# Patient Record
Sex: MALE | Race: WHITE | Employment: FULL TIME | URBAN - METROPOLITAN AREA
[De-identification: names, ages, dates, MRNs, and addresses within clinical notes are randomized per-mention and may not be internally consistent; named-entity substitution may affect disease eponyms.]

---

## 2017-01-06 ENCOUNTER — OFFICE VISIT (OUTPATIENT)
Dept: INTERNAL MEDICINE CLINIC | Age: 37
End: 2017-01-06

## 2017-01-06 VITALS
BODY MASS INDEX: 32.37 KG/M2 | WEIGHT: 239 LBS | SYSTOLIC BLOOD PRESSURE: 126 MMHG | HEART RATE: 72 BPM | HEIGHT: 72 IN | DIASTOLIC BLOOD PRESSURE: 70 MMHG

## 2017-01-06 DIAGNOSIS — K44.9 HIATAL HERNIA WITH GERD: ICD-10-CM

## 2017-01-06 DIAGNOSIS — K21.9 HIATAL HERNIA WITH GERD: ICD-10-CM

## 2017-01-06 PROCEDURE — 99212 OFFICE O/P EST SF 10 MIN: CPT | Performed by: INTERNAL MEDICINE

## 2017-01-06 RX ORDER — PANTOPRAZOLE SODIUM 40 MG/1
40 TABLET, DELAYED RELEASE ORAL DAILY
Qty: 30 TABLET | Refills: 11 | Status: SHIPPED | OUTPATIENT
Start: 2017-01-06 | End: 2018-01-11 | Stop reason: SDUPTHER

## 2017-01-06 ASSESSMENT — PATIENT HEALTH QUESTIONNAIRE - PHQ9
2. FEELING DOWN, DEPRESSED OR HOPELESS: 0
1. LITTLE INTEREST OR PLEASURE IN DOING THINGS: 0
SUM OF ALL RESPONSES TO PHQ9 QUESTIONS 1 & 2: 0
SUM OF ALL RESPONSES TO PHQ QUESTIONS 1-9: 0

## 2017-07-05 ENCOUNTER — OFFICE VISIT (OUTPATIENT)
Dept: INTERNAL MEDICINE CLINIC | Age: 37
End: 2017-07-05

## 2017-07-05 VITALS
HEIGHT: 72 IN | SYSTOLIC BLOOD PRESSURE: 118 MMHG | HEART RATE: 60 BPM | BODY MASS INDEX: 31.02 KG/M2 | DIASTOLIC BLOOD PRESSURE: 60 MMHG | WEIGHT: 229 LBS

## 2017-07-05 DIAGNOSIS — Z86.010 HISTORY OF COLON POLYPS: ICD-10-CM

## 2017-07-05 DIAGNOSIS — K21.9 GASTROESOPHAGEAL REFLUX DISEASE, ESOPHAGITIS PRESENCE NOT SPECIFIED: Primary | ICD-10-CM

## 2017-07-05 PROCEDURE — 99213 OFFICE O/P EST LOW 20 MIN: CPT | Performed by: INTERNAL MEDICINE

## 2018-01-03 ENCOUNTER — TELEPHONE (OUTPATIENT)
Dept: INTERNAL MEDICINE CLINIC | Age: 38
End: 2018-01-03

## 2018-01-03 NOTE — TELEPHONE ENCOUNTER
Wife calling for pt   Pt under a lot of stress and very moy   Both feel he would benefit from being on antidepressant ( wife takes prozac )   They were hoping to get pt started  On med and then f/u with you     Please advise

## 2018-01-11 DIAGNOSIS — K21.9 HIATAL HERNIA WITH GERD: ICD-10-CM

## 2018-01-11 DIAGNOSIS — K44.9 HIATAL HERNIA WITH GERD: ICD-10-CM

## 2018-01-11 RX ORDER — PANTOPRAZOLE SODIUM 40 MG/1
40 TABLET, DELAYED RELEASE ORAL DAILY
Qty: 30 TABLET | Refills: 11 | Status: SHIPPED | OUTPATIENT
Start: 2018-01-11 | End: 2019-01-11 | Stop reason: SDUPTHER

## 2018-09-17 ENCOUNTER — OFFICE VISIT (OUTPATIENT)
Dept: INTERNAL MEDICINE CLINIC | Age: 38
End: 2018-09-17

## 2018-09-17 VITALS
SYSTOLIC BLOOD PRESSURE: 120 MMHG | TEMPERATURE: 98.5 F | WEIGHT: 252 LBS | DIASTOLIC BLOOD PRESSURE: 68 MMHG | HEART RATE: 64 BPM | BODY MASS INDEX: 35.28 KG/M2 | HEIGHT: 71 IN

## 2018-09-17 DIAGNOSIS — R05.9 COUGH: Primary | ICD-10-CM

## 2018-09-17 PROCEDURE — 99213 OFFICE O/P EST LOW 20 MIN: CPT | Performed by: INTERNAL MEDICINE

## 2018-09-17 RX ORDER — LEVOFLOXACIN 500 MG/1
500 TABLET, FILM COATED ORAL DAILY
Qty: 7 TABLET | Refills: 0 | Status: SHIPPED | OUTPATIENT
Start: 2018-09-17 | End: 2018-09-24

## 2018-09-17 ASSESSMENT — PATIENT HEALTH QUESTIONNAIRE - PHQ9
1. LITTLE INTEREST OR PLEASURE IN DOING THINGS: 0
SUM OF ALL RESPONSES TO PHQ QUESTIONS 1-9: 0
SUM OF ALL RESPONSES TO PHQ9 QUESTIONS 1 & 2: 0
2. FEELING DOWN, DEPRESSED OR HOPELESS: 0
SUM OF ALL RESPONSES TO PHQ QUESTIONS 1-9: 0

## 2018-09-20 ENCOUNTER — HOSPITAL ENCOUNTER (OUTPATIENT)
Dept: OTHER | Age: 38
Discharge: HOME | End: 2018-09-20
Attending: INTERNAL MEDICINE | Admitting: INTERNAL MEDICINE

## 2018-09-20 ENCOUNTER — TELEPHONE (OUTPATIENT)
Dept: INTERNAL MEDICINE CLINIC | Age: 38
End: 2018-09-20

## 2018-09-20 DIAGNOSIS — R05.9 COUGH: ICD-10-CM

## 2018-09-20 DIAGNOSIS — R05.9 COUGH: Primary | ICD-10-CM

## 2018-09-20 NOTE — TELEPHONE ENCOUNTER
Wife concerned about pt not better   -- chest hurts  Cough and wheezing           Chest x ray per AB   Pt advised

## 2019-01-11 DIAGNOSIS — K44.9 HIATAL HERNIA WITH GERD: ICD-10-CM

## 2019-01-11 DIAGNOSIS — K21.9 HIATAL HERNIA WITH GERD: ICD-10-CM

## 2019-01-11 RX ORDER — PANTOPRAZOLE SODIUM 40 MG/1
40 TABLET, DELAYED RELEASE ORAL DAILY
Qty: 30 TABLET | Refills: 11 | Status: SHIPPED | OUTPATIENT
Start: 2019-01-11 | End: 2020-01-28

## 2019-03-18 ENCOUNTER — OFFICE VISIT (OUTPATIENT)
Dept: INTERNAL MEDICINE CLINIC | Age: 39
End: 2019-03-18
Payer: COMMERCIAL

## 2019-03-18 VITALS
WEIGHT: 260 LBS | TEMPERATURE: 98.6 F | HEART RATE: 64 BPM | DIASTOLIC BLOOD PRESSURE: 80 MMHG | BODY MASS INDEX: 36.26 KG/M2 | SYSTOLIC BLOOD PRESSURE: 120 MMHG

## 2019-03-18 DIAGNOSIS — G44.89 OTHER HEADACHE SYNDROME: Primary | ICD-10-CM

## 2019-03-18 PROCEDURE — 99213 OFFICE O/P EST LOW 20 MIN: CPT | Performed by: INTERNAL MEDICINE

## 2019-03-18 ASSESSMENT — PATIENT HEALTH QUESTIONNAIRE - PHQ9
SUM OF ALL RESPONSES TO PHQ9 QUESTIONS 1 & 2: 0
SUM OF ALL RESPONSES TO PHQ QUESTIONS 1-9: 0
1. LITTLE INTEREST OR PLEASURE IN DOING THINGS: 0
2. FEELING DOWN, DEPRESSED OR HOPELESS: 0
SUM OF ALL RESPONSES TO PHQ QUESTIONS 1-9: 0

## 2019-03-22 ENCOUNTER — TELEPHONE (OUTPATIENT)
Dept: INTERNAL MEDICINE CLINIC | Age: 39
End: 2019-03-22

## 2019-03-22 DIAGNOSIS — D35.2 PITUITARY ADENOMA WITH EXTRASELLAR EXTENSION (HCC): Primary | ICD-10-CM

## 2019-03-22 DIAGNOSIS — R93.0 ABNORMAL MRI OF HEAD: Primary | ICD-10-CM

## 2019-04-01 ENCOUNTER — TELEPHONE (OUTPATIENT)
Dept: INTERNAL MEDICINE CLINIC | Age: 39
End: 2019-04-01

## 2019-04-01 NOTE — TELEPHONE ENCOUNTER
Dr steele's office called and said that dr Shaw Trammell would like dr Royce Moreau to call him about this pt

## 2019-04-06 ENCOUNTER — HOSPITAL ENCOUNTER (OUTPATIENT)
Age: 39
Discharge: HOME | End: 2019-04-06
Payer: COMMERCIAL

## 2019-04-06 LAB
CORTISOL - AM: 9.7 UG/DL (ref 4.3–22.4)
FOLLICLE STIMULATING HORMONE: 2.8 MIU/ML
LUTEINIZING HORMONE: 2.9 MIU/ML
PROLACTIN: 8 NG/ML
TSH SERPL DL<=0.05 MIU/L-ACNC: 1.68 UIU/ML (ref 0.27–4.2)

## 2019-04-06 PROCEDURE — 84270 ASSAY OF SEX HORMONE GLOBUL: CPT

## 2019-04-06 PROCEDURE — 82024 ASSAY OF ACTH: CPT

## 2019-04-06 PROCEDURE — 84305 ASSAY OF SOMATOMEDIN: CPT

## 2019-04-06 PROCEDURE — 82533 TOTAL CORTISOL: CPT

## 2019-04-06 PROCEDURE — 83003 ASSAY GROWTH HORMONE (HGH): CPT

## 2019-04-06 PROCEDURE — 83002 ASSAY OF GONADOTROPIN (LH): CPT

## 2019-04-06 PROCEDURE — 84403 ASSAY OF TOTAL TESTOSTERONE: CPT

## 2019-04-06 PROCEDURE — 83001 ASSAY OF GONADOTROPIN (FSH): CPT

## 2019-04-06 PROCEDURE — 84146 ASSAY OF PROLACTIN: CPT

## 2019-04-06 PROCEDURE — 36415 COLL VENOUS BLD VENIPUNCTURE: CPT

## 2019-04-06 PROCEDURE — 84443 ASSAY THYROID STIM HORMONE: CPT

## 2019-04-09 LAB
GROWTH HORMONE: <0.05 NG/ML (ref 0.05–3)
IGF-1 (INSULIN-LIKE GROWTH I): 151 NG/ML (ref 83–238)
INSULIN-LIKE GROWTH FACTOR-1 Z-SCORE: 0
SEX HORMONE BINDING GLOBULIN: 10 NMOL/L (ref 11–80)
TESTOSTERONE FREE-NONMALE: 48.5 PG/ML (ref 47–244)
TESTOSTERONE TOTAL: 155 NG/DL (ref 220–1000)

## 2019-04-10 LAB — ADRENOCORTICOTROPIC HORMONE: 28 PG/ML (ref 7–69)

## 2019-07-01 ENCOUNTER — OFFICE VISIT (OUTPATIENT)
Dept: ENDOCRINOLOGY | Age: 39
End: 2019-07-01
Payer: COMMERCIAL

## 2019-07-01 VITALS
HEIGHT: 71 IN | BODY MASS INDEX: 37.52 KG/M2 | WEIGHT: 268 LBS | RESPIRATION RATE: 16 BRPM | DIASTOLIC BLOOD PRESSURE: 79 MMHG | OXYGEN SATURATION: 97 % | SYSTOLIC BLOOD PRESSURE: 128 MMHG | HEART RATE: 70 BPM

## 2019-07-01 DIAGNOSIS — D35.2 PITUITARY ADENOMA (HCC): Primary | ICD-10-CM

## 2019-07-01 PROCEDURE — 99204 OFFICE O/P NEW MOD 45 MIN: CPT | Performed by: INTERNAL MEDICINE

## 2019-07-01 NOTE — PROGRESS NOTES
Subjective:      43 y/o WM seen for pituitary adenoma. He had headache and MRA done showed an adenoma. Headache started 3/19, lasted for a week, now resolved  No vision changes    States visual field exam normal at [facility]  No breast discharge  No ED, low libido  No change in hand size, shoe size    He saw neurosurgery, was recommend 6 month f/u MRI    3/19  CONCLUSION:   1. 1.3 cm inhomogeneous sellar mass deviating the pituitary stalk to the left impinging on    infundibulum most consistent with macro adenoma.  Suspected extension into the cavernous    carotid region on the right, possibly on the left. 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  This mass deviates the infundibulum to the    left and mildly effaces the optic chiasm.  No evidence of cavernous sinus invasion. Moderate, no worsening factors. Labs:    4/19 Testosterone 155  FSH 2.5  LH 2.9  Prolactin 8  IGF-1 normal  ACTH 28 Cortisol 9.7    Past Medical History:   Diagnosis Date    Hyperglycemia     Hyperlipidemia      No past surgical history on file. Current Outpatient Medications   Medication Sig Dispense Refill    sertraline (ZOLOFT) 50 MG tablet TAKE ONE TABLET BY MOUTH DAILY 30 tablet 11    pantoprazole (PROTONIX) 40 MG tablet TAKE 1 TABLET BY MOUTH DAILY 30 tablet 11     No current facility-administered medications for this visit. Review of Systems  Please see scanned     Objective: There were no vitals taken for this visit.   Wt Readings from Last 3 Encounters:   03/18/19 260 lb (117.9 kg)   09/17/18 252 lb (114.3 kg)   07/05/17 229 lb (103.9 kg)       Constitutional: Well-developed, appears stated age, cooperative, in no acute distress  H/E/N/M/T:atraumatic, normocephalic, external ears, nose, lips normal without lesions  Eyes: Lids, lashes, conjunctivae and sclerae normal, No proptosis  Neck: supple, symmetrical, trachea midline   Thyroid: gland size normal, non-tender on

## 2019-07-19 ENCOUNTER — TELEPHONE (OUTPATIENT)
Dept: INTERNAL MEDICINE CLINIC | Age: 39
End: 2019-07-19

## 2019-07-19 DIAGNOSIS — G47.30 SLEEP APNEA, UNSPECIFIED TYPE: Primary | ICD-10-CM

## 2019-09-24 ENCOUNTER — OFFICE VISIT (OUTPATIENT)
Dept: INTERNAL MEDICINE CLINIC | Age: 39
End: 2019-09-24
Payer: COMMERCIAL

## 2019-09-24 VITALS
WEIGHT: 264 LBS | SYSTOLIC BLOOD PRESSURE: 130 MMHG | DIASTOLIC BLOOD PRESSURE: 80 MMHG | BODY MASS INDEX: 36.96 KG/M2 | HEIGHT: 71 IN | HEART RATE: 76 BPM

## 2019-09-24 DIAGNOSIS — Z23 NEED FOR INFLUENZA VACCINATION: ICD-10-CM

## 2019-09-24 DIAGNOSIS — Z00.00 PHYSICAL EXAM: Primary | ICD-10-CM

## 2019-09-24 LAB
A/G RATIO: 2.2 (ref 1.1–2.2)
ALBUMIN SERPL-MCNC: 4.6 G/DL (ref 3.4–5)
ALP BLD-CCNC: 104 U/L (ref 40–129)
ALT SERPL-CCNC: 39 U/L (ref 10–40)
ANION GAP SERPL CALCULATED.3IONS-SCNC: 14 MMOL/L (ref 3–16)
AST SERPL-CCNC: 29 U/L (ref 15–37)
BILIRUB SERPL-MCNC: 0.6 MG/DL (ref 0–1)
BUN BLDV-MCNC: 19 MG/DL (ref 7–20)
CALCIUM SERPL-MCNC: 9.2 MG/DL (ref 8.3–10.6)
CHLORIDE BLD-SCNC: 106 MMOL/L (ref 99–110)
CHOLESTEROL, TOTAL: 179 MG/DL (ref 0–199)
CO2: 23 MMOL/L (ref 21–32)
CREAT SERPL-MCNC: 1 MG/DL (ref 0.9–1.3)
GFR AFRICAN AMERICAN: >60
GFR NON-AFRICAN AMERICAN: >60
GLOBULIN: 2.1 G/DL
GLUCOSE BLD-MCNC: 121 MG/DL (ref 70–99)
HDLC SERPL-MCNC: 33 MG/DL (ref 40–60)
LDL CHOLESTEROL CALCULATED: ABNORMAL MG/DL
LDL CHOLESTEROL DIRECT: 93 MG/DL
POTASSIUM SERPL-SCNC: 4.2 MMOL/L (ref 3.5–5.1)
SODIUM BLD-SCNC: 143 MMOL/L (ref 136–145)
TOTAL PROTEIN: 6.7 G/DL (ref 6.4–8.2)
TRIGL SERPL-MCNC: 436 MG/DL (ref 0–150)
VLDLC SERPL CALC-MCNC: ABNORMAL MG/DL

## 2019-09-24 PROCEDURE — 90686 IIV4 VACC NO PRSV 0.5 ML IM: CPT | Performed by: INTERNAL MEDICINE

## 2019-09-24 PROCEDURE — 99395 PREV VISIT EST AGE 18-39: CPT | Performed by: INTERNAL MEDICINE

## 2019-09-24 PROCEDURE — 90471 IMMUNIZATION ADMIN: CPT | Performed by: INTERNAL MEDICINE

## 2019-09-24 NOTE — PROGRESS NOTES
Chief Complaint   Patient presents with   [facility] 44 y.o. male is here for  physical examination. The patient denies chest pain, chest tightness, shortness of breath or other cardiovascular symptoms suggesting end organ damage from their hypertension. There has been compliance to medications and no new problems are reported. Current Outpatient Medications   Medication Sig Dispense Refill    sertraline (ZOLOFT) 50 MG tablet TAKE ONE TABLET BY MOUTH DAILY 30 tablet 11    pantoprazole (PROTONIX) 40 MG tablet TAKE 1 TABLET BY MOUTH DAILY 30 tablet 11     No current facility-administered medications for this visit.         Past Medical History:   Diagnosis Date    Hyperglycemia     Hyperlipidemia            /80   Pulse 76   Ht 5' 11\" (1.803 m)   Wt 264 lb (119.7 kg)   BMI 36.82 kg/m²     General Appearance:  Alert, cooperative, no distress, appears stated age   Head:  Normocephalic, without obvious abnormality, atraumatic   Neck: Supple, symmetrical, trachea midline, no adenopathy, thyroid: not enlarged, symmetric, no tenderness/mass/nodules, no carotid bruit or JVD   Lungs:   Clear to auscultation bilaterally, respirations unlabored   Chest Wall:  No tenderness or deformity   Heart:  Regular rate and rhythm, S1, S2 normal, no murmur, rub or gallop   Abdomen:   Soft, non-tender, bowel sounds active all four quadrants,  no masses, no organomegaly, no hernias   Skin: Skin color, texture, turgor normal, no rashes or lesions   Lymph nodes: Cervical, supraclavicular  Adenopathy is absent   Neurologic: No focal neurological deficits noted, funduscopic examination no papilledema       No components found for: CHLPL  Lab Results   Component Value Date    TRIG 299 (H) 01/24/2011     Lab Results   Component Value Date    HDL 28 (L) 01/24/2011     Lab Results   Component Value Date    LDLCALC 83 01/24/2011     Lab Results   Component Value Date    LABVLDL 60 01/24/2011     Lab

## 2020-01-28 RX ORDER — PANTOPRAZOLE SODIUM 40 MG/1
TABLET, DELAYED RELEASE ORAL
Qty: 90 TABLET | Refills: 3 | Status: SHIPPED | OUTPATIENT
Start: 2020-01-28 | End: 2021-05-13 | Stop reason: SDUPTHER

## 2020-02-10 ENCOUNTER — HOSPITAL ENCOUNTER (OUTPATIENT)
Age: 40
Discharge: HOME | End: 2020-02-10
Payer: COMMERCIAL

## 2020-02-10 ENCOUNTER — TELEPHONE (OUTPATIENT)
Dept: ENDOCRINOLOGY | Age: 40
End: 2020-02-10

## 2020-02-10 LAB
PROLACTIN: 7.7 NG/ML
T3 FREE: 3.4 PG/ML (ref 2.3–4.2)
T4 FREE: 0.8 NG/DL (ref 0.9–1.8)

## 2020-02-10 PROCEDURE — 82530 CORTISOL FREE: CPT

## 2020-02-10 PROCEDURE — 84146 ASSAY OF PROLACTIN: CPT

## 2020-02-10 PROCEDURE — 84481 FREE ASSAY (FT-3): CPT

## 2020-02-10 PROCEDURE — 36415 COLL VENOUS BLD VENIPUNCTURE: CPT

## 2020-02-10 PROCEDURE — 84439 ASSAY OF FREE THYROXINE: CPT

## 2020-02-13 LAB
CORTISOL (UR), FREE: 45 UG/D
CORTISOL URINE, FREE (/G CRT): 16.04 UG/G CRT
CORTISOL,F,UG/L,U: 30 UG/L
CREATININE 24 HOUR URINE: 2805 MG/D (ref 1000–2500)
CREATININE URINE: 187 MG/DL
HOURS COLLECTED: 24 HR
INTERPRETATION: ABNORMAL
URINE TOTAL VOLUME: 1500 ML

## 2020-02-19 ENCOUNTER — OFFICE VISIT (OUTPATIENT)
Dept: ENDOCRINOLOGY | Age: 40
End: 2020-02-19
Payer: COMMERCIAL

## 2020-02-19 VITALS — OXYGEN SATURATION: 97 % | HEART RATE: 67 BPM | SYSTOLIC BLOOD PRESSURE: 112 MMHG | DIASTOLIC BLOOD PRESSURE: 67 MMHG

## 2020-02-19 PROCEDURE — 99213 OFFICE O/P EST LOW 20 MIN: CPT | Performed by: INTERNAL MEDICINE

## 2020-02-19 NOTE — PROGRESS NOTES
Subjective:      43 y/o WM seen for pituitary adenoma. Interim:  Had MRI 2/20  No increase  Saw [facility]    He had headache and MRA done showed an adenoma. Headache started 3/19, lasted for a week, now resolved  No vision changes    States visual field exam normal at [facility]  No breast discharge  No ED, low libido  No change in hand size, shoe size    He saw neurosurgery, was recommend 6 month f/u MRI    3/19  CONCLUSION:   1. 1.3 cm inhomogeneous sellar mass deviating the pituitary stalk to the left impinging on    infundibulum most consistent with macro adenoma.  Suspected extension into the cavernous    carotid region on the right, possibly on the left. 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  This mass deviates the infundibulum to the    left and mildly effaces the optic chiasm.  No evidence of cavernous sinus invasion. Moderate, no worsening factors. 2/20 MRI  Pituitary macro adenoma measuring 1.51cm by 1.11cm.  This mass deviates the infundibulum to the    left and mildly effaces the optic chiasm.  No evidence of cavernous sinus invasion. Labs:    4/19 Testosterone 155  FSH 2.5  LH 2.9  Prolactin 8  IGF-1 normal  ACTH 28 Cortisol 9.7    1/20  Cortisol urine 24 normal  FT4. 0.8 L FT3 nl  Prolactin nl    Past Medical History:   Diagnosis Date    Hyperglycemia     Hyperlipidemia      No past surgical history on file. Current Outpatient Medications   Medication Sig Dispense Refill    pantoprazole (PROTONIX) 40 MG tablet TAKE ONE TABLET BY MOUTH DAILY 90 tablet 3    sertraline (ZOLOFT) 50 MG tablet TAKE ONE TABLET BY MOUTH DAILY 30 tablet 11     No current facility-administered medications for this visit.           Review of Systems  Please see scanned     Objective:    /67 (Site: Left Upper Arm, Position: Sitting, Cuff Size: Medium Adult)   Pulse 67   SpO2 97%   Wt Readings from Last 3 Encounters:   09/24/19 264 lb (119.7 kg)   07/01/19 268 lb (121.6 kg)   03/18/19 260 lb (117.9 kg)       Constitutional: Well-developed, appears stated age, cooperative, in no acute distress  H/E/N/M/T:atraumatic, normocephalic, external ears, nose, lips normal without lesions  Eyes: Lids, lashes, conjunctivae and sclerae normal, No proptosis  Neck: supple, symmetrical, trachea midline   Thyroid: gland size normal, non-tender on palpation  Respiratory: clear to auscultation bilaterally and breathing is unlabored  Cardiovascular: regular rate and rhythm, S1, S2, regular rate and rhythm, no murmur, rub or gallop. Skin: No obvious rashes or lesions present. Skin and hair texture normal  Psychiatric: Judgement and Insight:  judgement and insight appear normal  Neuro: Normal without focal findings, speech is normal normal, speech is spontaneous, and movements are coordinated, alert and oriented x3    Lab Review  Lab Results   Component Value Date    TSH 1.68 04/06/2019     No results found for: FREET4      Assessment:     Pituitary Adenoma: Macroadenoma 1.5cm, incidental finding, non functional,nl 24 hour urine cortisol and free thyroid level. VF is normal. Testosterone is low , not symptomatic, will hold on replacement . [address] low but IGF-1 is normal. 6 month f/u stable. Discussed symptoms of apoplexy . FT4 slightly low, repeat in a year    Plan:     1. FT4, FT3, TSH in a year  2. Prolactin in a year

## 2020-12-07 ENCOUNTER — TELEPHONE (OUTPATIENT)
Dept: ENT CLINIC | Age: 40
End: 2020-12-07

## 2020-12-11 ENCOUNTER — TELEPHONE (OUTPATIENT)
Dept: ENT CLINIC | Age: 40
End: 2020-12-11

## 2020-12-15 ENCOUNTER — OFFICE VISIT (OUTPATIENT)
Dept: ENT CLINIC | Age: 40
End: 2020-12-15
Payer: COMMERCIAL

## 2020-12-15 VITALS
TEMPERATURE: 97.7 F | BODY MASS INDEX: 38.84 KG/M2 | HEIGHT: 71 IN | SYSTOLIC BLOOD PRESSURE: 117 MMHG | DIASTOLIC BLOOD PRESSURE: 75 MMHG | HEART RATE: 65 BPM | WEIGHT: 277.4 LBS

## 2020-12-15 PROCEDURE — 99244 OFF/OP CNSLTJ NEW/EST MOD 40: CPT | Performed by: OTOLARYNGOLOGY

## 2020-12-15 ASSESSMENT — ENCOUNTER SYMPTOMS
CONSTIPATION: 0
WHEEZING: 0
NAUSEA: 0
RHINORRHEA: 0
TROUBLE SWALLOWING: 0
FACIAL SWELLING: 0
SHORTNESS OF BREATH: 0
CHEST TIGHTNESS: 0
SINUS PRESSURE: 0
APNEA: 0
VOICE CHANGE: 0
VOMITING: 0
EYE PAIN: 0
CHOKING: 0
COLOR CHANGE: 0
COUGH: 0
DIARRHEA: 0
STRIDOR: 0
BLOOD IN STOOL: 0
SORE THROAT: 0
SINUS PAIN: 0
BACK PAIN: 0
EYE DISCHARGE: 0

## 2020-12-15 NOTE — PROGRESS NOTES
Endocrine: Negative for cold intolerance, heat intolerance, polydipsia, polyphagia and polyuria. Musculoskeletal: Negative for back pain, gait problem, neck pain and neck stiffness. Skin: Negative for color change, pallor, rash and wound. Allergic/Immunologic: Negative for environmental allergies, food allergies and immunocompromised state. Neurological: Negative for dizziness, facial asymmetry, speech difficulty, light-headedness, numbness and headaches. Hematological: Negative for adenopathy. Does not bruise/bleed easily. Psychiatric/Behavioral: Negative for agitation, confusion, self-injury and sleep disturbance. The patient is not nervous/anxious. Objective:   Physical Exam  Constitutional:       Appearance: He is well-developed. He is not ill-appearing. HENT:      Head: Normocephalic and atraumatic. Not macrocephalic and not microcephalic. No raccoon eyes, Dueñas's sign, abrasion, contusion, right periorbital erythema, left periorbital erythema or laceration. Hair is normal.      Jaw: No trismus. Salivary Glands: Right salivary gland is not diffusely enlarged. Left salivary gland is not diffusely enlarged. Right Ear: Hearing, tympanic membrane and external ear normal. No decreased hearing noted. No drainage, swelling or tenderness. No middle ear effusion. No mastoid tenderness. Tympanic membrane is not perforated, retracted or bulging. Tympanic membrane has normal mobility. Left Ear: Hearing, tympanic membrane and external ear normal. No decreased hearing noted. No drainage, swelling or tenderness. No middle ear effusion. No mastoid tenderness. Tympanic membrane is not perforated, retracted or bulging. Tympanic membrane has normal mobility. Ears:      Osborne exam findings: does not lateralize. Right Rinne: AC > BC. Left Rinne: AC > BC. Nose: No nasal deformity, septal deviation, laceration, mucosal edema or rhinorrhea. Right Nostril: No epistaxis. Left Nostril: No epistaxis. Right Turbinates: Not enlarged. Left Turbinates: Not enlarged. Right Sinus: No maxillary sinus tenderness or frontal sinus tenderness. Left Sinus: No maxillary sinus tenderness or frontal sinus tenderness. Mouth/Throat:      Lips: No lesions. Mouth: Mucous membranes are not pale, not dry and not cyanotic. No lacerations or oral lesions. Dentition: Normal dentition. No dental caries or dental abscesses. Tongue: No lesions. Palate: No mass. Pharynx: Uvula midline. No oropharyngeal exudate, posterior oropharyngeal erythema or uvula swelling. Tonsils: No tonsillar abscesses. Eyes:      General: Lids are normal. Lids are everted, no foreign bodies appreciated. Right eye: No discharge. Left eye: No discharge. Extraocular Movements:      Right eye: Normal extraocular motion and no nystagmus. Left eye: Normal extraocular motion and no nystagmus. Conjunctiva/sclera:      Right eye: No chemosis or exudate. Left eye: No chemosis or exudate. Neck:      Musculoskeletal: Neck supple. Thyroid: No thyroid mass or thyromegaly. Vascular: Normal carotid pulses. Trachea: Trachea normal. No tracheal deviation. Cardiovascular:      Rate and Rhythm: Normal rate and regular rhythm. Pulmonary:      Effort: No tachypnea, bradypnea or respiratory distress. Breath sounds: No stridor. Musculoskeletal:      Right shoulder: He exhibits normal range of motion. Left shoulder: He exhibits normal range of motion. Lymphadenopathy:      Head:      Right side of head: No submental, submandibular, tonsillar, preauricular, posterior auricular or occipital adenopathy. Left side of head: No submental, submandibular, tonsillar, preauricular, posterior auricular or occipital adenopathy. Cervical:      Right cervical: No superficial, deep or posterior cervical adenopathy. Left cervical: No superficial, deep or posterior cervical adenopathy. Skin:     Findings: No bruising, erythema, laceration or lesion. Neurological:      Mental Status: He is alert and oriented to person, place, and time. Psychiatric:         Speech: Speech normal.         Behavior: Behavior normal.         Assessment:       Diagnosis Orders   1. Pituitary macroadenoma ([facility] [address]  CT SINUS WO CONTRAST           Plan:      OR for endo pit. The patient has a diagnosis of pituitary tumor which has not been responsive or cannot be treated with maximal medical therapy. The patient has been advised of options including further medical therapy, surgery, or nothing. The risks and benefits of surgery were described not limited to injury to the skull base, brain, stroke, hemorrhage, brain fluid leak, double vision, visual loss, epistaxis, and need for further surgical management of disease. Patient expectations during and after surgery including post-operative sinonasal care and pain control were described. Questions were answered and the patient agreed to proceed with surgery which will be scheduled in an appropriate time frame in line with the severity of disease. The patient was counseled at length about the risks of ashley Covid-19 in the tianna-operative and post-operative states including the recovery window of their procedure. The patient was made aware that ashley Covid-19 after a surgical procedure may worsen their prognosis for recovering from the virus and lend to a higher morbidity and or mortality risk. The patient was given the options of postponing their procedure. All of the risks, benefits, and alternatives were discussed. The patient does wish to proceed with the procedure. Medical Decision Making:   The following items were considered in medical decision making:  Independent review of images  Review / order clinical lab tests  Review / order radiology tests Decision to obtain old records  Review and summation of old records as accessed through [facility] (a summary of my findings in these old records: [facility] notes, imaging and labs)                  Andreas Carey MD

## 2021-01-13 ENCOUNTER — HOSPITAL ENCOUNTER (OUTPATIENT)
Dept: CT IMAGING | Age: 41
Discharge: HOME | End: 2021-01-13
Payer: COMMERCIAL

## 2021-01-13 DIAGNOSIS — D35.2 PITUITARY MACROADENOMA (HCC): ICD-10-CM

## 2021-01-13 PROCEDURE — 70486 CT MAXILLOFACIAL W/O DYE: CPT

## 2021-01-19 ENCOUNTER — OFFICE VISIT (OUTPATIENT)
Dept: INTERNAL MEDICINE CLINIC | Age: 41
End: 2021-01-19
Payer: COMMERCIAL

## 2021-01-19 VITALS
HEART RATE: 78 BPM | TEMPERATURE: 97.5 F | WEIGHT: 273 LBS | HEIGHT: 72 IN | DIASTOLIC BLOOD PRESSURE: 64 MMHG | BODY MASS INDEX: 36.98 KG/M2 | OXYGEN SATURATION: 98 % | SYSTOLIC BLOOD PRESSURE: 120 MMHG

## 2021-01-19 DIAGNOSIS — D35.2 PITUITARY MACROADENOMA (HCC): ICD-10-CM

## 2021-01-19 DIAGNOSIS — Z01.818 PRE-OP TESTING: Primary | ICD-10-CM

## 2021-01-19 DIAGNOSIS — Z23 NEED FOR INFLUENZA VACCINATION: ICD-10-CM

## 2021-01-19 PROCEDURE — 90471 IMMUNIZATION ADMIN: CPT | Performed by: NURSE PRACTITIONER

## 2021-01-19 PROCEDURE — 99242 OFF/OP CONSLTJ NEW/EST SF 20: CPT | Performed by: NURSE PRACTITIONER

## 2021-01-19 PROCEDURE — 90674 CCIIV4 VAC NO PRSV 0.5 ML IM: CPT | Performed by: NURSE PRACTITIONER

## 2021-01-19 PROCEDURE — 93000 ELECTROCARDIOGRAM COMPLETE: CPT | Performed by: NURSE PRACTITIONER

## 2021-01-19 ASSESSMENT — PATIENT HEALTH QUESTIONNAIRE - PHQ9
SUM OF ALL RESPONSES TO PHQ QUESTIONS 1-9: 0
1. LITTLE INTEREST OR PLEASURE IN DOING THINGS: 0
2. FEELING DOWN, DEPRESSED OR HOPELESS: 0

## 2021-01-19 NOTE — PROGRESS NOTES
[address] [facility] patients having surgery or anesthesia are required to be Covid tested. You will need to quarantine from the time you are tested until your surgery. PRIOR TO PROCEDURE DATE:        1. PLEASE FOLLOW ANY  GUIDELINES/ INSTRUCTIONS PRIOR TO YOUR PROCEDURE AS ADVISED BY YOUR SURGEON. 2. Arrange for someone to drive you home and be with you for the first 24 hours after discharge for your safety after your procedure for which you received sedation. Ensure it is someone we can share information with regarding your discharge. 3. You must contact your surgeon for instructions IF:  ? You are taking any blood thinners, aspirin, anti-inflammatory or vitamin E.  ? There is a change in your physical condition such as a cold, fever, rash, cuts, sores or any other infection, especially near your surgical site. 4. Do not drink alcohol the day before or day of your procedure. 5. A Pre-op History and Physical for surgery MUST be completed by your Physician or Urgent Care within 30 days of your procedure date. Please bring a copy with you on the day of your procedure and along with any other testing performed. THE DAY OF YOUR PROCEDURE:  1. Follow instructions for ARRIVAL TIME as DIRECTED BY YOUR SURGEON. I    1. Enter the MAIN entrance from [address] and follow the signs to the free [employer] or [employer] parking (offered free of charge 6am-5pm). 2. Enter the Main Entrance of the hospital (do not enter from the lower level of the parking garage). Upon entrance, check in with the  at the main desk on your left. If no one is available at the desk, proceed into the [facility] Waiting Room and go through the door directly into the [facility]. There is a Check-in desk ACROSS from Room 5 (marked with a sign hanging from the ceiling). The phone number for the surgery center is [phone]. 4. Please call [phone] option #2 option #2 if you have not been preregistered yet. On the day of your procedure bring your insurance card and photo ID. You will be registered at your bedside once brought back to your room. 5. DO NOT EAT ANYTHING eight hours prior to your arrival for surgery. May have 8 ounces of water 4 hours prior to your arrival for surgery. NOTE: ALL Gastric, Bariatric and Bowel surgery patients MUST follow their surgeon's instructions. 6. MEDICATIONS   ? Take the following medications with a SMALL sip of water: none  ? Use your usual dose of inhalers the morning of surgery. BRING your rescue inhaler with you to hospital.   ? Anesthesia does NOT want you to take insulin the morning of surgery. They will control your blood sugar while you are at the hospital. Please contact your ordering physician for instructions regarding your insulin the night before your procedure. If you have an insulin pump, please keep it set on basal rate. 7. Do not swallow water when brushing teeth. No gum, candy, mints or ice chips. Refrain from smoking or at least decrease the amount. 8. Dress in loose, comfortable clothing appropriate for redressing after your procedure. Do not wear jewelry (including body piercings), make-up (especially NO eye make-up), fingernail polish (NO toenail polish if foot/leg surgery), lotion, powders or metal hairclips. 9. Dentures, glasses, or contacts will need to be removed before your procedure. Bring cases for your glasses, contacts, dentures, or hearing aids to protect them while you are in surgery. 10. If you use a CPAP, please bring it with you on the day of your procedure. 11. We recommend that valuable personal  belongings such as cash, cell phones, e-tablets or jewelry, be left at home during your stay. The hospital will not be responsible for valuables that are not secured in the hospital safe. However, if your insurance requires a co-pay, you may want to bring a method of payment, i.e. Check or credit card, if you wish to pay your co-pay the day of surgery. 12. If you are to stay overnight, you may bring a bag with personal items. Please have any large items you may need brought in by your family after your arrival to your hospital room. 15. If you have a Living Will or Durable Power of , please bring a copy on the day of your procedure. 15. With your permission, one family member may accompany you while you are being prepared for surgery. Once you are ready, additional family members may join you. HOW WE KEEP YOU SAFE and WORK TO PREVENT SURGICAL SITE INFECTIONS:  1. Health care workers should always check your ID bracelet to verify your name and birth date. You will be asked many times to state your name, date of birth, and allergies. 2. Health care workers should always clean their hands with soap or alcohol gel before providing care to you. It is okay to ask anyone if they cleaned their hands before they touch you. 3. You will be actively involved in verifying the type of procedure you are having and ensuring the correct surgical site. This will be confirmed multiple times prior to your procedure. Do NOT ashley your surgery site UNLESS instructed to by your surgeon. 4. Do not shave or wax for 72 hours prior to procedure near your operative site. Shaving with a razor can irritate your skin and make it easier to develop an infection. On the day of your procedure, any hair that needs to be removed near the surgical site will be clipped by a healthcare worker using a special clippers designed to avoid skin irritation. 5. When you are in the operating room, your surgical site will be cleansed with a special soap, and in most cases, you will be given an antibiotic before the surgery begins. What to expect AFTER YOUR PROCEDURE:  1. Immediately following your procedure, your will be taken to the PACU for the first phase of your recovery. Your nurse will help you recover from any potential side effects of anesthesia, such as extreme drowsiness, changes in your vital signs or breathing patterns. Nausea, headache, muscle aches, or sore throat may also occur after anesthesia. Your nurse will help you manage these potential side effects. 2. For comfort and safety, arrange to have someone at home with you for the first 24 hours after discharge. 3. You and your family will be given written instructions about your diet, activity, dressing care, medications, and return visits. 4. Once at home, should issues with nausea, pain, or bleeding occur, or should you notice any signs of infection, you should call your surgeon. 5. Always clean your hands before and after caring for your wound. Do not let your family touch your surgery site without cleaning their hands. 6. Narcotic pain medications can cause significant constipation. You may want to add a stool softener to your postoperative medication schedule or speak to your surgeon on how best to manage this SIDE EFFECT. SPECIAL INSTRUCTIONS reviewed restricted visitation that is in place as of today with patient, bring in cpap but can clarify the use of in light of the type of surgery he is having. Ultimately to follow instructions that have been given to him by surgeons regarding when to be NPO. May bring in small bag for personal items since being admitted day of surgery. Patient acknowledges understanding of pre op instructions. Thank you for allowing us to care for you. We strive to exceed your expectations in the delivery of care and service provided to you and your family. If you need to contact the [place] [address] staff for any reason, please call us at [phone]    Instructions reviewed with patient during preadmission testing phone interview. Alem Champagne. 1/19/2021 .11:35 AM      ADDITIONAL EDUCATIONAL INFORMATION REVIEWED PER PHONE WITH YOU AND/OR YOUR FAMILY:  No Bring a urine sample on day of surgery  Yes Hibiclens® Bathing Instructions   No Antibacterial Soap

## 2021-01-19 NOTE — PROGRESS NOTES
Preoperative Consultation      Lesly Vivar  YOB: 1980    Date of Service:  1/19/2021    This patient presents today at the request of the surgeon for a preoperative consultation. Surgeon: Dr. Avtar Wolfe  Indication for surgery: pituitary macroadenoma  Scheduled procedure: Pituitary tumor transsphenoidal stereotactic surgery  Date of surgery: 1/27/21  Location of surgery: [facility]  Indicated/required preoperative testing: H&P, EKG  Smoker: No  Previous anesthesia complications: No  Family history of anesthesia complications: No  Loose, capped or false teeth: No  Recent chest pain or SOB: No  Known Bleeding Risk: No recent or remote history of abnormal bleeding  Personal or FH of DVT/PE: No    Patient objection to receiving blood products: No      No Known Allergies      Current Outpatient Medications   Medication Sig Dispense Refill    Cholecalciferol (VITAMIN D3) 125 MCG (5000 UT) TABS Take by mouth      Loratadine (CLARITIN PO) Take by mouth      pantoprazole (PROTONIX) 40 MG tablet TAKE ONE TABLET BY MOUTH DAILY 90 tablet 3    Multiple Vitamins-Minerals (MULTIVITAMIN ADULT PO) Take by mouth      sertraline (ZOLOFT) 50 MG tablet TAKE ONE TABLET BY MOUTH DAILY (Patient not taking: Reported on 12/15/2020) 90 tablet 3     No current facility-administered medications for this visit. Patient Active Problem List   Diagnosis    Hyperglycemia    Hyperlipidemia       Past Medical History:   Diagnosis Date    Allergic rhinitis     Dizziness     GERD (gastroesophageal reflux disease)     Headache     Hyperglycemia     Hyperlipidemia     Recurrent upper respiratory infection (URI)     Sleep apnea     Tinnitus        No past surgical history on file. Family History   Problem Relation Age of Onset    Elevated Lipids Mother     Hypertension Father          Review of Systems  A comprehensive review of systems was negative except for what was noted in the HPI.        Physical Exam Vitals:    01/19/21 0902   Temp: 97.5 °F (36.4 °C)   Weight: 273 lb (123.8 kg)   Height: 5' 11.5\" (1.816 m)        Constitutional: He is oriented to person, place, and time. He appears well-developed and well-nourished. No distress. HENT:   Head: Normocephalic and atraumatic. Mouth/Throat: Uvula is midline, oropharynx is clear and moist and mucous membranes are normal.   Eyes: Conjunctivae and EOM are normal.   Neck: Trachea normal and normal range of motion. Neck supple. Cardiovascular: Normal rate, regular rhythm, normal heart sounds and intact distal pulses. Pulmonary/Chest: Effort normal and breath sounds normal. No respiratory distress. He has no wheezes. He has no rales. Abdominal: Soft, non-tender. Bowel sounds are normal.   Musculoskeletal: He exhibits no edema and no tenderness. Neurological: He is alert and oriented to person, place, and time. Skin: Skin is warm and dry. No rash noted. No erythema. Psychiatric: He has a normal mood and affect. His behavior is normal.       EKG Interpretation: normal sinus rhythm  Lab Review:  Lab Results   Component Value Date     09/24/2019    K 4.2 09/24/2019     09/24/2019    CO2 23 09/24/2019    BUN 19 09/24/2019    CREATININE 1.0 09/24/2019    GLUCOSE 121 09/24/2019    CALCIUM 9.2 09/24/2019     No results found for: WBC, HGB, HCT, MCV, PLT      Assessment:     36 y.o. patient with planned surgery as above. Known risk factors for perioperative complications: GERD, Obstructive sleep apnea     Plan:     1. Preoperative workup as follows: none  2. Change in medication regimen before surgery: Discontinue MVI - already done  3. Prophylaxis for cardiac events with perioperative beta-blockers: Not indicated  4. Deep vein thrombosis prophylaxis: regimen to be chosen by surgical team  5. No contraindications to planned surgery.       Kartik Frederick, [address] [place] [address],[address] Internal Medicine and Rheumatology  [phone]

## 2021-01-20 ENCOUNTER — HOSPITAL ENCOUNTER (OUTPATIENT)
Age: 41
Discharge: HOME | End: 2021-01-20
Payer: COMMERCIAL

## 2021-01-20 LAB
ANION GAP SERPL CALCULATED.3IONS-SCNC: 13 MMOL/L (ref 3–16)
APTT: 29.7 SEC (ref 24.2–36.2)
BUN BLDV-MCNC: 21 MG/DL (ref 7–20)
CALCIUM SERPL-MCNC: 9.6 MG/DL (ref 8.3–10.6)
CHLORIDE BLD-SCNC: 108 MMOL/L (ref 99–110)
CO2: 23 MMOL/L (ref 21–32)
CORTISOL - AM: 12.7 UG/DL (ref 4.3–22.4)
CREAT SERPL-MCNC: 1 MG/DL (ref 0.9–1.3)
FOLLICLE STIMULATING HORMONE: 3.6 MIU/ML
GFR AFRICAN AMERICAN: >60
GFR NON-AFRICAN AMERICAN: >60
GLUCOSE BLD-MCNC: 109 MG/DL (ref 70–99)
HCT VFR BLD CALC: 43.3 % (ref 40.5–52.5)
HEMOGLOBIN: 14.5 G/DL (ref 13.5–17.5)
INR BLD: 1.09 (ref 0.86–1.14)
LUTEINIZING HORMONE: 4.1 MIU/ML
MCH RBC QN AUTO: 28.8 PG (ref 26–34)
MCHC RBC AUTO-ENTMCNC: 33.5 G/DL (ref 31–36)
MCV RBC AUTO: 85.9 FL (ref 80–100)
PDW BLD-RTO: 13.8 % (ref 12.4–15.4)
PLATELET # BLD: 228 K/UL (ref 135–450)
PMV BLD AUTO: 8.6 FL (ref 5–10.5)
POTASSIUM SERPL-SCNC: 4.4 MMOL/L (ref 3.5–5.1)
PROLACTIN: 7.8 NG/ML
PROTHROMBIN TIME: 12.6 SEC (ref 10–13.2)
RBC # BLD: 5.04 M/UL (ref 4.2–5.9)
SODIUM BLD-SCNC: 144 MMOL/L (ref 136–145)
TSH SERPL DL<=0.05 MIU/L-ACNC: 2.22 UIU/ML (ref 0.27–4.2)
WBC # BLD: 5.6 K/UL (ref 4–11)

## 2021-01-20 PROCEDURE — 84305 ASSAY OF SOMATOMEDIN: CPT

## 2021-01-20 PROCEDURE — 36415 COLL VENOUS BLD VENIPUNCTURE: CPT

## 2021-01-20 PROCEDURE — 84403 ASSAY OF TOTAL TESTOSTERONE: CPT

## 2021-01-20 PROCEDURE — 85027 COMPLETE CBC AUTOMATED: CPT

## 2021-01-20 PROCEDURE — 83002 ASSAY OF GONADOTROPIN (LH): CPT

## 2021-01-20 PROCEDURE — 83003 ASSAY GROWTH HORMONE (HGH): CPT

## 2021-01-20 PROCEDURE — 82533 TOTAL CORTISOL: CPT

## 2021-01-20 PROCEDURE — 80048 BASIC METABOLIC PNL TOTAL CA: CPT

## 2021-01-20 PROCEDURE — 83001 ASSAY OF GONADOTROPIN (FSH): CPT

## 2021-01-20 PROCEDURE — 84146 ASSAY OF PROLACTIN: CPT

## 2021-01-20 PROCEDURE — 84443 ASSAY THYROID STIM HORMONE: CPT

## 2021-01-20 PROCEDURE — 82024 ASSAY OF ACTH: CPT

## 2021-01-20 PROCEDURE — 85730 THROMBOPLASTIN TIME PARTIAL: CPT

## 2021-01-20 PROCEDURE — 85610 PROTHROMBIN TIME: CPT

## 2021-01-21 ENCOUNTER — OFFICE VISIT (OUTPATIENT)
Dept: PRIMARY CARE CLINIC | Age: 41
End: 2021-01-21
Payer: COMMERCIAL

## 2021-01-21 DIAGNOSIS — Z20.828 EXPOSURE TO SARS-ASSOCIATED CORONAVIRUS: Primary | ICD-10-CM

## 2021-01-21 LAB
GROWTH HORMONE: <0.05 NG/ML (ref 0.05–3)
SARS-COV-2, PCR: NOT DETECTED

## 2021-01-21 PROCEDURE — 99211 OFF/OP EST MAY X REQ PHY/QHP: CPT | Performed by: NURSE PRACTITIONER

## 2021-01-21 NOTE — PROGRESS NOTES
[facility] received a viral test for COVID-19. They were educated on isolation and quarantine as appropriate. For any symptoms, they were directed to seek care from their PCP, given contact information to establish with a doctor, directed to an urgent care or the emergency room.

## 2021-01-21 NOTE — PATIENT INSTRUCTIONS
You have received a viral test for COVID-19. Below is education on quarantine per the CDC guidelines. For any symptoms, seek care from your PCP, call [phone] to establish care with a doctor, or go directly to an urgent care or the emergency room. Test results will take 2-7 days and will be sent to you in your [employer] account. If you test positive, you will be contacted via phone. If you test negative, the ONLY communication will be through [address] GO TO [place] AND SIGN UP FOR [employer]  (LOWER LEFT OF THE HOME PAGE)  No test is 100%. If you have symptoms, you should follow the guidance of quarantine as previously stated. You can still be contagious if you have symptoms. Your [facility] will reach out to you if you have a positive result. They will provide you with a return to work date and note. If you were tested for a pre-op, then you should remain in quarantine until your procedure. How do I know if I need to be in quarantine? If you live in a community where COVID-19 is or might be spreading (currently, that is virtually everywhere in the [facility])  Be alert for symptoms. Watch for fever, cough, shortness of breath, or other symptoms of COVID-19.  ? Take your temperature if symptoms develop. ? Practice social distancing. Maintain 6 feet of distance from others and stay out of crowded places. ? Follow CDC guidance if symptoms develop. If you feel healthy but:  ? Recently had close contact with a person with COVID-19 you need to Quarantine:  ? Stay home until 14 days after your last exposure. ? Check your temperature twice a day and watch for symptoms of COVID-19.  ? If possible, stay away from people who are at higher-risk for getting very sick from COVID-19. Stay Home and Monitor Your Health if you:  ? Have been diagnosed with COVID-19, or  ? Are waiting for test results, or  ?  Have cough, fever, or shortness of breath, or symptoms of COVID-19 When You Can be Around Others After You Had or Likely Had COVID-19     If you have or think you might have COVID-19, it is important to stay home and away from other people. Staying away from others helps stop the spread of COVID-19. If you have an emergency warning sign (including trouble breathing), get emergency medical care immediately. When you can be around others (end home isolation) depends on different factors for different situations. Find CDC's recommendations for your situation below. I think or know I had COVID-19, and I had symptoms  You can be with others after  ? 3 days with no fever and  ? Respiratory symptoms have improved (e.g. cough, shortness of breath) and  ? 10 days since symptoms first appeared  Depending on your healthcare provider's advice and availability of testing, you might get tested to see if you still have COVID-19. If you will be tested, you can be around others when you have no fever, respiratory symptoms have improved, and you receive two negative test results in a row, at least 24 hours apart. I tested positive for COVID-19 but had no symptoms  If you continue to have no symptoms, you can be with others after:  ? 10 days have passed since test or 14 days since your exposure test   Depending on your healthcare provider's advice and availability of testing, you might get tested to see if you still have COVID-19. If you will be tested, you can be around others after you receive two negative test results in a row, at least 24 hours apart. If you develop symptoms after testing positive, follow the guidance above for I think or know I had COVID, and I had symptoms.   For Anyone Who Has Been Around a Person with COVID-19  It is important to remember that anyone who has close contact with someone with COVID-19 should stay home for 14 days after exposure based on the time it takes to develop illness. Testing is not necessary.     www.cdc.gov/coronavirus/2019-ncov/index.html

## 2021-01-22 LAB
IGF-1 (INSULIN-LIKE GROWTH I): 147 NG/ML (ref 81–236)
INSULIN-LIKE GROWTH FACTOR-1 Z-SCORE: 0
TESTOSTERONE TOTAL: 127 NG/DL (ref 220–1000)

## 2021-01-25 LAB — ADRENOCORTICOTROPIC HORMONE: 25 PG/ML (ref 7–69)

## 2021-01-26 ENCOUNTER — ANESTHESIA EVENT (OUTPATIENT)
Dept: OPERATING ROOM | Age: 41
DRG: 615 | End: 2021-01-26
Payer: COMMERCIAL

## 2021-01-26 ENCOUNTER — TELEPHONE (OUTPATIENT)
Dept: ENT CLINIC | Age: 41
End: 2021-01-26

## 2021-01-27 ENCOUNTER — HOSPITAL ENCOUNTER (INPATIENT)
Age: 41
LOS: 2 days | Discharge: HOME | DRG: 615 | End: 2021-01-29
Attending: NEUROLOGICAL SURGERY | Admitting: NEUROLOGICAL SURGERY
Payer: COMMERCIAL

## 2021-01-27 ENCOUNTER — APPOINTMENT (OUTPATIENT)
Dept: CT IMAGING | Age: 41
DRG: 615 | End: 2021-01-27
Attending: NEUROLOGICAL SURGERY
Payer: COMMERCIAL

## 2021-01-27 ENCOUNTER — ANESTHESIA (OUTPATIENT)
Dept: OPERATING ROOM | Age: 41
DRG: 615 | End: 2021-01-27
Payer: COMMERCIAL

## 2021-01-27 VITALS — TEMPERATURE: 98.2 F | SYSTOLIC BLOOD PRESSURE: 123 MMHG | DIASTOLIC BLOOD PRESSURE: 77 MMHG | OXYGEN SATURATION: 99 %

## 2021-01-27 DIAGNOSIS — D35.2 PITUITARY MACROADENOMA (HCC): ICD-10-CM

## 2021-01-27 LAB
ABO/RH: NORMAL
ANTIBODY SCREEN: NORMAL
BILIRUBIN URINE: NEGATIVE
BLOOD, URINE: ABNORMAL
CLARITY: CLEAR
COLOR: YELLOW
EPITHELIAL CELLS, UA: ABNORMAL /HPF (ref 0–5)
GLUCOSE URINE: NEGATIVE MG/DL
KETONES, URINE: NEGATIVE MG/DL
LEUKOCYTE ESTERASE, URINE: NEGATIVE
MICROSCOPIC EXAMINATION: YES
NITRITE, URINE: NEGATIVE
OSMOLALITY URINE: 954 MOSM/KG (ref 390–1070)
OSMOLALITY: 305 MOSM/KG (ref 278–305)
PH UA: 6 (ref 5–8)
PROTEIN UA: NEGATIVE MG/DL
RBC UA: ABNORMAL /HPF (ref 0–4)
SODIUM URINE: 177 MMOL/L
SPECIFIC GRAVITY UA: >=1.03 (ref 1–1.03)
URINE TYPE: ABNORMAL
UROBILINOGEN, URINE: 0.2 E.U./DL
WBC UA: ABNORMAL /HPF (ref 0–5)

## 2021-01-27 PROCEDURE — 3600000004 HC SURGERY LEVEL 4 BASE: Performed by: NEUROLOGICAL SURGERY

## 2021-01-27 PROCEDURE — 2580000003 HC RX 258: Performed by: NURSE PRACTITIONER

## 2021-01-27 PROCEDURE — 2580000003 HC RX 258: Performed by: ANESTHESIOLOGY

## 2021-01-27 PROCEDURE — 3700000000 HC ANESTHESIA ATTENDED CARE: Performed by: NEUROLOGICAL SURGERY

## 2021-01-27 PROCEDURE — 3600000014 HC SURGERY LEVEL 4 ADDTL 15MIN: Performed by: NEUROLOGICAL SURGERY

## 2021-01-27 PROCEDURE — 86901 BLOOD TYPING SEROLOGIC RH(D): CPT

## 2021-01-27 PROCEDURE — 2000000000 HC ICU R&B

## 2021-01-27 PROCEDURE — 2580000003 HC RX 258: Performed by: NURSE ANESTHETIST, CERTIFIED REGISTERED

## 2021-01-27 PROCEDURE — 83930 ASSAY OF BLOOD OSMOLALITY: CPT

## 2021-01-27 PROCEDURE — 6360000002 HC RX W HCPCS: Performed by: ANESTHESIOLOGY

## 2021-01-27 PROCEDURE — 88331 PATH CONSLTJ SURG 1 BLK 1SPC: CPT

## 2021-01-27 PROCEDURE — 84300 ASSAY OF URINE SODIUM: CPT

## 2021-01-27 PROCEDURE — 70450 CT HEAD/BRAIN W/O DYE: CPT

## 2021-01-27 PROCEDURE — 62165 REMOVE PITUIT TUMOR W/SCOPE: CPT | Performed by: OTOLARYNGOLOGY

## 2021-01-27 PROCEDURE — 3700000001 HC ADD 15 MINUTES (ANESTHESIA): Performed by: NEUROLOGICAL SURGERY

## 2021-01-27 PROCEDURE — 81001 URINALYSIS AUTO W/SCOPE: CPT

## 2021-01-27 PROCEDURE — 8E09XBG COMPUTER ASSISTED PROCEDURE OF HEAD AND NECK REGION, WITH COMPUTERIZED TOMOGRAPHY: ICD-10-PCS | Performed by: OTOLARYNGOLOGY

## 2021-01-27 PROCEDURE — 2500000003 HC RX 250 WO HCPCS: Performed by: NURSE ANESTHETIST, CERTIFIED REGISTERED

## 2021-01-27 PROCEDURE — 61782 SCAN PROC CRANIAL EXTRA: CPT | Performed by: OTOLARYNGOLOGY

## 2021-01-27 PROCEDURE — 86850 RBC ANTIBODY SCREEN: CPT

## 2021-01-27 PROCEDURE — 6360000002 HC RX W HCPCS: Performed by: NURSE PRACTITIONER

## 2021-01-27 PROCEDURE — 83935 ASSAY OF URINE OSMOLALITY: CPT

## 2021-01-27 PROCEDURE — 2580000003 HC RX 258: Performed by: NEUROLOGICAL SURGERY

## 2021-01-27 PROCEDURE — 6370000000 HC RX 637 (ALT 250 FOR IP): Performed by: NEUROLOGICAL SURGERY

## 2021-01-27 PROCEDURE — 0GB04ZZ EXCISION OF PITUITARY GLAND, PERCUTANEOUS ENDOSCOPIC APPROACH: ICD-10-PCS | Performed by: OTOLARYNGOLOGY

## 2021-01-27 PROCEDURE — 6370000000 HC RX 637 (ALT 250 FOR IP): Performed by: NURSE PRACTITIONER

## 2021-01-27 PROCEDURE — 7100000001 HC PACU RECOVERY - ADDTL 15 MIN: Performed by: NEUROLOGICAL SURGERY

## 2021-01-27 PROCEDURE — 7100000000 HC PACU RECOVERY - FIRST 15 MIN: Performed by: NEUROLOGICAL SURGERY

## 2021-01-27 PROCEDURE — 6360000002 HC RX W HCPCS: Performed by: NURSE ANESTHETIST, CERTIFIED REGISTERED

## 2021-01-27 PROCEDURE — 2720000010 HC SURG SUPPLY STERILE: Performed by: NEUROLOGICAL SURGERY

## 2021-01-27 PROCEDURE — 86900 BLOOD TYPING SEROLOGIC ABO: CPT

## 2021-01-27 PROCEDURE — 6360000002 HC RX W HCPCS: Performed by: NEUROLOGICAL SURGERY

## 2021-01-27 PROCEDURE — 2780000010 HC IMPLANT OTHER: Performed by: NEUROLOGICAL SURGERY

## 2021-01-27 PROCEDURE — 2500000003 HC RX 250 WO HCPCS: Performed by: NEUROLOGICAL SURGERY

## 2021-01-27 PROCEDURE — 2709999900 HC NON-CHARGEABLE SUPPLY: Performed by: NEUROLOGICAL SURGERY

## 2021-01-27 DEVICE — DURAGEN® PLUS DURAL REGENERATION MATRIX, 3 IN X 3 IN (7.5 CM X 7.5 CM)
Type: IMPLANTABLE DEVICE | Status: FUNCTIONAL
Brand: DURAGEN® PLUS

## 2021-01-27 RX ORDER — SODIUM CHLORIDE 9 MG/ML
INJECTION, SOLUTION INTRAVENOUS CONTINUOUS
Status: DISCONTINUED | OUTPATIENT
Start: 2021-01-27 | End: 2021-01-28

## 2021-01-27 RX ORDER — OXYMETAZOLINE HYDROCHLORIDE 0.05 G/100ML
SPRAY NASAL PRN
Status: DISCONTINUED | OUTPATIENT
Start: 2021-01-27 | End: 2021-01-27 | Stop reason: ALTCHOICE

## 2021-01-27 RX ORDER — CEFAZOLIN SODIUM 2 G/50ML
2 SOLUTION INTRAVENOUS ONCE
Status: COMPLETED | OUTPATIENT
Start: 2021-01-27 | End: 2021-01-27

## 2021-01-27 RX ORDER — MIDAZOLAM HYDROCHLORIDE 1 MG/ML
INJECTION INTRAMUSCULAR; INTRAVENOUS PRN
Status: DISCONTINUED | OUTPATIENT
Start: 2021-01-27 | End: 2021-01-27 | Stop reason: SDUPTHER

## 2021-01-27 RX ORDER — SODIUM CHLORIDE 0.9 % (FLUSH) 0.9 %
10 SYRINGE (ML) INJECTION EVERY 12 HOURS SCHEDULED
Status: DISCONTINUED | OUTPATIENT
Start: 2021-01-27 | End: 2021-01-27 | Stop reason: HOSPADM

## 2021-01-27 RX ORDER — PROMETHAZINE HYDROCHLORIDE 25 MG/ML
6.25 INJECTION, SOLUTION INTRAMUSCULAR; INTRAVENOUS
Status: COMPLETED | OUTPATIENT
Start: 2021-01-27 | End: 2021-01-27

## 2021-01-27 RX ORDER — SODIUM CHLORIDE, SODIUM LACTATE, POTASSIUM CHLORIDE, CALCIUM CHLORIDE 600; 310; 30; 20 MG/100ML; MG/100ML; MG/100ML; MG/100ML
INJECTION, SOLUTION INTRAVENOUS CONTINUOUS
Status: DISCONTINUED | OUTPATIENT
Start: 2021-01-27 | End: 2021-01-28

## 2021-01-27 RX ORDER — LIDOCAINE HYDROCHLORIDE 10 MG/ML
1 INJECTION, SOLUTION EPIDURAL; INFILTRATION; INTRACAUDAL; PERINEURAL
Status: DISCONTINUED | OUTPATIENT
Start: 2021-01-27 | End: 2021-01-27 | Stop reason: HOSPADM

## 2021-01-27 RX ORDER — LABETALOL 20 MG/4 ML (5 MG/ML) INTRAVENOUS SYRINGE
5 EVERY 10 MIN PRN
Status: DISCONTINUED | OUTPATIENT
Start: 2021-01-27 | End: 2021-01-27 | Stop reason: HOSPADM

## 2021-01-27 RX ORDER — SODIUM CHLORIDE, SODIUM LACTATE, POTASSIUM CHLORIDE, AND CALCIUM CHLORIDE .6; .31; .03; .02 G/100ML; G/100ML; G/100ML; G/100ML
IRRIGANT IRRIGATION PRN
Status: DISCONTINUED | OUTPATIENT
Start: 2021-01-27 | End: 2021-01-27 | Stop reason: ALTCHOICE

## 2021-01-27 RX ORDER — ACETAMINOPHEN 325 MG/1
650 TABLET ORAL EVERY 6 HOURS
Status: DISCONTINUED | OUTPATIENT
Start: 2021-01-27 | End: 2021-01-29 | Stop reason: HOSPADM

## 2021-01-27 RX ORDER — EPINEPHRINE NASAL SOLUTION 1 MG/ML
SOLUTION NASAL PRN
Status: DISCONTINUED | OUTPATIENT
Start: 2021-01-27 | End: 2021-01-27 | Stop reason: ALTCHOICE

## 2021-01-27 RX ORDER — LIDOCAINE HYDROCHLORIDE AND EPINEPHRINE 5; 5 MG/ML; UG/ML
INJECTION, SOLUTION INFILTRATION; PERINEURAL PRN
Status: DISCONTINUED | OUTPATIENT
Start: 2021-01-27 | End: 2021-01-27 | Stop reason: ALTCHOICE

## 2021-01-27 RX ORDER — SODIUM CHLORIDE, SODIUM LACTATE, POTASSIUM CHLORIDE, CALCIUM CHLORIDE 600; 310; 30; 20 MG/100ML; MG/100ML; MG/100ML; MG/100ML
INJECTION, SOLUTION INTRAVENOUS CONTINUOUS PRN
Status: DISCONTINUED | OUTPATIENT
Start: 2021-01-27 | End: 2021-01-27 | Stop reason: SDUPTHER

## 2021-01-27 RX ORDER — ROCURONIUM BROMIDE 10 MG/ML
INJECTION, SOLUTION INTRAVENOUS PRN
Status: DISCONTINUED | OUTPATIENT
Start: 2021-01-27 | End: 2021-01-27 | Stop reason: SDUPTHER

## 2021-01-27 RX ORDER — FENTANYL CITRATE 50 UG/ML
INJECTION, SOLUTION INTRAMUSCULAR; INTRAVENOUS PRN
Status: DISCONTINUED | OUTPATIENT
Start: 2021-01-27 | End: 2021-01-27 | Stop reason: SDUPTHER

## 2021-01-27 RX ORDER — DEXAMETHASONE SODIUM PHOSPHATE 4 MG/ML
INJECTION, SOLUTION INTRA-ARTICULAR; INTRALESIONAL; INTRAMUSCULAR; INTRAVENOUS; SOFT TISSUE PRN
Status: DISCONTINUED | OUTPATIENT
Start: 2021-01-27 | End: 2021-01-27 | Stop reason: SDUPTHER

## 2021-01-27 RX ORDER — PROPOFOL 10 MG/ML
INJECTION, EMULSION INTRAVENOUS PRN
Status: DISCONTINUED | OUTPATIENT
Start: 2021-01-27 | End: 2021-01-27 | Stop reason: SDUPTHER

## 2021-01-27 RX ORDER — FENTANYL CITRATE 50 UG/ML
25 INJECTION, SOLUTION INTRAMUSCULAR; INTRAVENOUS EVERY 5 MIN PRN
Status: DISCONTINUED | OUTPATIENT
Start: 2021-01-27 | End: 2021-01-27 | Stop reason: HOSPADM

## 2021-01-27 RX ORDER — PANTOPRAZOLE SODIUM 40 MG/1
40 TABLET, DELAYED RELEASE ORAL DAILY
Status: DISCONTINUED | OUTPATIENT
Start: 2021-01-27 | End: 2021-01-29 | Stop reason: HOSPADM

## 2021-01-27 RX ORDER — OXYMETAZOLINE HYDROCHLORIDE 0.05 G/100ML
2 SPRAY NASAL 2 TIMES DAILY
Status: DISCONTINUED | OUTPATIENT
Start: 2021-01-27 | End: 2021-01-29 | Stop reason: HOSPADM

## 2021-01-27 RX ORDER — ONDANSETRON 2 MG/ML
4 INJECTION INTRAMUSCULAR; INTRAVENOUS
Status: DISCONTINUED | OUTPATIENT
Start: 2021-01-27 | End: 2021-01-27 | Stop reason: HOSPADM

## 2021-01-27 RX ORDER — 0.9 % SODIUM CHLORIDE 0.9 %
500 INTRAVENOUS SOLUTION INTRAVENOUS
Status: DISCONTINUED | OUTPATIENT
Start: 2021-01-27 | End: 2021-01-27 | Stop reason: HOSPADM

## 2021-01-27 RX ORDER — HYDROMORPHONE HCL 110MG/55ML
PATIENT CONTROLLED ANALGESIA SYRINGE INTRAVENOUS PRN
Status: DISCONTINUED | OUTPATIENT
Start: 2021-01-27 | End: 2021-01-27 | Stop reason: SDUPTHER

## 2021-01-27 RX ORDER — METHYLPREDNISOLONE SODIUM SUCCINATE 1 G/16ML
INJECTION, POWDER, LYOPHILIZED, FOR SOLUTION INTRAMUSCULAR; INTRAVENOUS PRN
Status: DISCONTINUED | OUTPATIENT
Start: 2021-01-27 | End: 2021-01-27 | Stop reason: SDUPTHER

## 2021-01-27 RX ORDER — ONDANSETRON 2 MG/ML
INJECTION INTRAMUSCULAR; INTRAVENOUS PRN
Status: DISCONTINUED | OUTPATIENT
Start: 2021-01-27 | End: 2021-01-27 | Stop reason: SDUPTHER

## 2021-01-27 RX ORDER — SODIUM CHLORIDE 0.9 % (FLUSH) 0.9 %
10 SYRINGE (ML) INJECTION PRN
Status: DISCONTINUED | OUTPATIENT
Start: 2021-01-27 | End: 2021-01-27 | Stop reason: HOSPADM

## 2021-01-27 RX ORDER — METHOCARBAMOL 750 MG/1
750 TABLET, FILM COATED ORAL EVERY 6 HOURS SCHEDULED
Status: DISCONTINUED | OUTPATIENT
Start: 2021-01-28 | End: 2021-01-29 | Stop reason: HOSPADM

## 2021-01-27 RX ADMIN — SUGAMMADEX 200 MG: 100 INJECTION, SOLUTION INTRAVENOUS at 10:54

## 2021-01-27 RX ADMIN — ONDANSETRON 4 MG: 2 INJECTION INTRAMUSCULAR; INTRAVENOUS at 08:31

## 2021-01-27 RX ADMIN — CEFAZOLIN SODIUM 2 G: 2 SOLUTION INTRAVENOUS at 08:44

## 2021-01-27 RX ADMIN — ROCURONIUM BROMIDE 60 MG: 10 INJECTION INTRAVENOUS at 08:31

## 2021-01-27 RX ADMIN — SODIUM CHLORIDE 1500 MG: 900 INJECTION INTRAVENOUS at 21:04

## 2021-01-27 RX ADMIN — MIDAZOLAM HYDROCHLORIDE 2 MG: 2 INJECTION, SOLUTION INTRAMUSCULAR; INTRAVENOUS at 08:27

## 2021-01-27 RX ADMIN — METHOCARBAMOL 1000 MG: 100 INJECTION, SOLUTION INTRAMUSCULAR; INTRAVENOUS at 12:57

## 2021-01-27 RX ADMIN — METHOCARBAMOL 1000 MG: 100 INJECTION, SOLUTION INTRAMUSCULAR; INTRAVENOUS at 20:47

## 2021-01-27 RX ADMIN — SODIUM CHLORIDE: 9 INJECTION, SOLUTION INTRAVENOUS at 12:58

## 2021-01-27 RX ADMIN — DEXAMETHASONE SODIUM PHOSPHATE 8 MG: 4 INJECTION, SOLUTION INTRAMUSCULAR; INTRAVENOUS at 08:31

## 2021-01-27 RX ADMIN — HYDROMORPHONE HYDROCHLORIDE 0.5 MG: 2 INJECTION, SOLUTION INTRAMUSCULAR; INTRAVENOUS; SUBCUTANEOUS at 09:35

## 2021-01-27 RX ADMIN — METHYLPREDNISOLONE SODIUM SUCCINATE 100 MG: 1 INJECTION, POWDER, FOR SOLUTION INTRAMUSCULAR; INTRAVENOUS at 08:49

## 2021-01-27 RX ADMIN — ROCURONIUM BROMIDE 40 MG: 10 INJECTION INTRAVENOUS at 09:10

## 2021-01-27 RX ADMIN — PROPOFOL 200 MG: 10 INJECTION, EMULSION INTRAVENOUS at 08:31

## 2021-01-27 RX ADMIN — SALINE NASAL SPRAY 1 SPRAY: 1.5 SOLUTION NASAL at 21:09

## 2021-01-27 RX ADMIN — SODIUM CHLORIDE, POTASSIUM CHLORIDE, SODIUM LACTATE AND CALCIUM CHLORIDE: 600; 310; 30; 20 INJECTION, SOLUTION INTRAVENOUS at 07:52

## 2021-01-27 RX ADMIN — HYDROMORPHONE HYDROCHLORIDE 0.5 MG: 2 INJECTION, SOLUTION INTRAMUSCULAR; INTRAVENOUS; SUBCUTANEOUS at 11:05

## 2021-01-27 RX ADMIN — SODIUM CHLORIDE, SODIUM LACTATE, POTASSIUM CHLORIDE, AND CALCIUM CHLORIDE: .6; .31; .03; .02 INJECTION, SOLUTION INTRAVENOUS at 08:27

## 2021-01-27 RX ADMIN — HYDROMORPHONE HYDROCHLORIDE 1 MG: 2 INJECTION, SOLUTION INTRAMUSCULAR; INTRAVENOUS; SUBCUTANEOUS at 09:10

## 2021-01-27 RX ADMIN — FENTANYL CITRATE 100 MCG: 50 INJECTION INTRAMUSCULAR; INTRAVENOUS at 08:31

## 2021-01-27 RX ADMIN — ACETAMINOPHEN 650 MG: 325 TABLET ORAL at 20:47

## 2021-01-27 RX ADMIN — PROMETHAZINE HYDROCHLORIDE 6.25 MG: 25 INJECTION INTRAMUSCULAR; INTRAVENOUS at 11:53

## 2021-01-27 RX ADMIN — ONDANSETRON 4 MG: 2 INJECTION INTRAMUSCULAR; INTRAVENOUS at 10:54

## 2021-01-27 ASSESSMENT — PULMONARY FUNCTION TESTS
PIF_VALUE: 26
PIF_VALUE: 24
PIF_VALUE: 26
PIF_VALUE: 24
PIF_VALUE: 5
PIF_VALUE: 25
PIF_VALUE: 25
PIF_VALUE: 24
PIF_VALUE: 24
PIF_VALUE: 0
PIF_VALUE: 25
PIF_VALUE: 6
PIF_VALUE: 24
PIF_VALUE: 26
PIF_VALUE: 6
PIF_VALUE: 24
PIF_VALUE: 25
PIF_VALUE: 3
PIF_VALUE: 25
PIF_VALUE: 24
PIF_VALUE: 25
PIF_VALUE: 24
PIF_VALUE: 0
PIF_VALUE: 24
PIF_VALUE: 25
PIF_VALUE: 24
PIF_VALUE: 26
PIF_VALUE: 24
PIF_VALUE: 26
PIF_VALUE: 24
PIF_VALUE: 26
PIF_VALUE: 2
PIF_VALUE: 24
PIF_VALUE: 24
PIF_VALUE: 26
PIF_VALUE: 25
PIF_VALUE: 24
PIF_VALUE: 26
PIF_VALUE: 25
PIF_VALUE: 24
PIF_VALUE: 26
PIF_VALUE: 25
PIF_VALUE: 24
PIF_VALUE: 25
PIF_VALUE: 6
PIF_VALUE: 24
PIF_VALUE: 24
PIF_VALUE: 26
PIF_VALUE: 0
PIF_VALUE: 24
PIF_VALUE: 32
PIF_VALUE: 5
PIF_VALUE: 24
PIF_VALUE: 25
PIF_VALUE: 24
PIF_VALUE: 24
PIF_VALUE: 25
PIF_VALUE: 24
PIF_VALUE: 0
PIF_VALUE: 25
PIF_VALUE: 24
PIF_VALUE: 26
PIF_VALUE: 24
PIF_VALUE: 25
PIF_VALUE: 24
PIF_VALUE: 25

## 2021-01-27 ASSESSMENT — PAIN DESCRIPTION - ORIENTATION: ORIENTATION: ANTERIOR

## 2021-01-27 ASSESSMENT — PAIN DESCRIPTION - LOCATION: LOCATION: HEAD

## 2021-01-27 ASSESSMENT — PAIN DESCRIPTION - PAIN TYPE: TYPE: SURGICAL PAIN

## 2021-01-27 ASSESSMENT — PAIN SCALES - GENERAL
PAINLEVEL_OUTOF10: 0
PAINLEVEL_OUTOF10: 4

## 2021-01-27 NOTE — OP NOTE
Surgery began in the right nasal cavity. Utilizing a zero degree endoscope and a caudel elevator the middle and superior turbinate was lateralized in its inferior third. The natural ostium of the sphenoid sinus was identified and fractured medially and inferiorly. A large sphenoidotomy was created in the medial, inferior and lateral direction to the superior turbinate. Suction Bovie cautery was used to cauterize the posterior nasal artery, horizontally along the septal bone and vertical along the bone, cartilage interface of the septum. Attention was turned to the left nasal cavity. Utilizing a zero degree endoscope and a caudel elevator the middle and superior turbinate was lateralized in its inferior third. The natural ostium of the sphenoid sinus was identified and fractured medially and inferiorly. A large sphenoidotomy was created in the medial, inferior and lateral direction to the superior turbinate. Suction Bovie cautery was used to cauterize the posterior nasal artery, horizontally along the septal bone and vertical along the bone, cartilage interface of the septum. Mucosa was then elevated of the septal bone from anterior to posterior to the sphenoidotomies. This mucosa was removed preserving superior mucosa to cover bone and preserve olfaction and inferior mucosa to cover exposed septal bone. The underlying bone was harvested using endoscopic scissors to prevent injury to the skull base. This removed bone will be used by the neurosurgical service later in the case for closure of the sella. The sphenoidotomy was then widened inferior, lateral and superior to allow visualization of the optic nerves, carotid arteries, planum sphenoidale, tuberculum, sella and clival recess. The inter-sinus septum was then removed with true cut rongeurs and Kerrison rongeurs. The cut edges of the septal mucosa and sellar face was then cauterized with bipolar cautery. The wound was then irrigated with sterile normal saline. The procedure was then turned over to the Neurosurgery service and will be dictated by Dr. Venkatesh Medeiros. I provided neuro endoscopy throughout the entire neurosurgerical portion of the case. Once this was completed. Nasopore was placed between the middle turbinate and septum bilaterally to prevent synechiae. Shannon splints were not placed. The patient was then awoken from general anesthesia, extubated, and sent to the post anesthesia care unit in stable condition. I attest that I was present for and performed the key points of the ENT procedure myself and provided neuro endoscopy throughout the entire neurosurgical portion of the case.        Electronically signed by Jacqueline Saunders MD on 1/27/2021 at 11:31 AM

## 2021-01-27 NOTE — PROGRESS NOTES
Patient admitted to pacu post [address] TUMOR with Dr. Julio C Flower. Patient connected to bedside monitors; VSS. Per CRNA patient was stable intraop. Patient remains sedated with simple mask in place.

## 2021-01-27 NOTE — ANESTHESIA PRE PROCEDURE
Department of Anesthesiology  Preprocedure Note       Name:  Lesly Vivar   Age:  39 y.o.  :  1980                                          MRN:  [id number]         Date:  2021      Surgeon: Leanne Sadler):  MD Christina Scales MD    Procedure: Procedure(s):  ENDOSCOPIC ENDONASAL APPROACH FOR RESECTION OF PITUITARY TUMOR, POSSIBLE LUMBAR DRAIN, POSSIBLE ABDOMINAL FAT GRAFT  NEUROENDOSCOPY WITH REMOVAL OF PITUITARY TUMOR TRANSSPHENOIDAL, STEREOTACTIC SURGERY    Medications prior to admission:   Prior to Admission medications    Medication Sig Start Date End Date Taking? Authorizing Provider   Loratadine (CLARITIN PO) Take by mouth nightly    Yes Historical Provider, MD   pantoprazole (PROTONIX) 40 MG tablet TAKE ONE TABLET BY MOUTH DAILY  Patient taking differently: nightly Do not crush or break. 20  Yes Tami Eisenberg MD   Multiple Vitamins-Minerals (MULTIVITAMIN ADULT PO) Take by mouth    Historical Provider, MD   Cholecalciferol (VITAMIN D3) 125 MCG (5000 UT) TABS Take by mouth    Historical Provider, MD       Current medications:    Current Facility-Administered Medications   Medication Dose Route Frequency Provider Last Rate Last Admin    ceFAZolin (ANCEF) 2000 mg in dextrose 3 % 50 mL IVPB (duplex)  2 g Intravenous Once Stevenson Walton MD        lactated ringers infusion   Intravenous Continuous Avinash Parmar MD        sodium chloride flush 0.9 % injection 10 mL  10 mL Intravenous 2 times per day Avinash Parmar MD        sodium chloride flush 0.9 % injection 10 mL  10 mL Intravenous PRN Avinash Parmar MD        lidocaine PF 1 % injection 1 mL  1 mL Intradermal Once PRN Avinash Parmar MD           Allergies:  No Known Allergies    Problem List:    Patient Active Problem List   Diagnosis Code    Hyperglycemia R73.9    Hyperlipidemia E78.5    Pituitary macroadenoma ([facility] [address] D35.2       Past Medical History:        Diagnosis Date LABGLOM >60 01/20/2021    GLUCOSE 109 01/20/2021    PROT 6.7 09/24/2019    PROT 7.0 01/24/2011    CALCIUM 9.6 01/20/2021    BILITOT 0.6 09/24/2019    ALKPHOS 104 09/24/2019    AST 29 09/24/2019    ALT 39 09/24/2019       POC Tests: No results for input(s): POCGLU, POCNA, POCK, POCCL, POCBUN, POCHEMO, POCHCT in the last 72 hours. Coags:   Lab Results   Component Value Date    PROTIME 12.6 01/20/2021    INR 1.09 01/20/2021    APTT 29.7 01/20/2021       HCG (If Applicable): No results found for: PREGTESTUR, PREGSERUM, HCG, HCGQUANT     ABGs: No results found for: PHART, PO2ART, NSC1JDQ, QCT2ZCA, BEART, W1OVDLCM     Type & Screen (If Applicable):  No results found for: LABABO, LABRH    Drug/Infectious Status (If Applicable):  No results found for: HIV, HEPCAB    COVID-19 Screening (If Applicable):   Lab Results   Component Value Date    COVID19 Not Detected 01/21/2021         Anesthesia Evaluation  Patient summary reviewed and Nursing notes reviewed  Airway: Mallampati: III  TM distance: >3 FB   Neck ROM: full  Mouth opening: > = 3 FB Dental: normal exam         Pulmonary:normal exam  breath sounds clear to auscultation  (+) recent URI: resolved,  sleep apnea: on CPAP,                             Cardiovascular:Negative CV ROS          ECG reviewed  Rhythm: regular  Rate: normal           Beta Blocker:  Not on Beta Blocker         Neuro/Psych:   (+) headaches:,             GI/Hepatic/Renal:   (+) GERD: well controlled,           Endo/Other: Negative Endo/Other ROS                    Abdominal:   (+) obese,     Abdomen: soft. Vascular: negative vascular ROS. Anesthesia Plan      general     ASA 2       Induction: intravenous. MIPS: Postoperative opioids intended and Prophylactic antiemetics administered. Anesthetic plan and risks discussed with patient. Use of blood products discussed with patient whom consented to blood products. Plan discussed with CRNA. Attending anesthesiologist reviewed and agrees with Pre Eval content              Alma Rosa Baltazar DO   1/27/2021

## 2021-01-27 NOTE — H&P
[address]    [id number]    [facility] Same Day Surgery Update H & P  Department of General Surgery   Surgical Service   Pre-operative History and Physical  Last H & P within the last 30 days. DIAGNOSIS:   Pituitary macroadenoma ([address] [D35.2]    Procedure(s):  ENDOSCOPIC ENDONASAL APPROACH FOR RESECTION OF PITUITARY TUMOR, POSSIBLE LUMBAR DRAIN, POSSIBLE ABDOMINAL FAT GRAFT  NEUROENDOSCOPY WITH REMOVAL OF PITUITARY TUMOR TRANSSPHENOIDAL, STEREOTACTIC SURGERY     HISTORY OF PRESENT ILLNESS:   Patient had incidental finding of pituitary macroadenoma 1 year ago during workup for HA. The tumor is increasing in size and the patient presents today for the above procedure. Covid 19:  Patient denies fever, chills, cough or known exposure to Covid-19. Past Medical History:        Diagnosis Date    Allergic rhinitis     Dizziness     GERD (gastroesophageal reflux disease)     Headache     Hyperglycemia     JURGEN on CPAP     Recurrent upper respiratory infection (URI)     Sleep apnea     Tinnitus      Past Surgical History:        Procedure Laterality Date    COLONOSCOPY      WISDOM TOOTH EXTRACTION       Past Social History:  Social History     Socioeconomic History    Marital status:      Spouse name: None    Number of children: None    Years of education: None    Highest education level: None   Occupational History    None   Social Needs    Financial resource strain: None    Food insecurity     Worry: None     Inability: None    Transportation needs     Medical: None     Non-medical: None   Tobacco Use    Smoking status: Never Smoker    Smokeless tobacco: Never Used   Substance and Sexual Activity    Alcohol use:  Yes     Alcohol/week: 1.0 standard drinks     Types: 1 Shots of liquor per week     Comment: ONCE A MONTH    Drug use: Never    Sexual activity: Yes   Lifestyle    Physical activity     Days per week: None     Minutes per session: None    Stress: None Relationships    Social connections     Talks on phone: None     Gets together: None     Attends [employer] service: None     Active member of club or organization: None     Attends meetings of clubs or organizations: None     Relationship status: None    Intimate partner violence     Fear of current or ex partner: None     Emotionally abused: None     Physically abused: None     Forced sexual activity: None   Other Topics Concern    None   Social History Narrative    None         Medications Prior to Admission:      Prior to Admission medications    Medication Sig Start Date End Date Taking? Authorizing Provider   Loratadine (CLARITIN PO) Take by mouth nightly    Yes Historical Provider, MD   pantoprazole (PROTONIX) 40 MG tablet TAKE ONE TABLET BY MOUTH DAILY  Patient taking differently: nightly Do not crush or break. 1/28/20  Yes Gonzalo Colindres MD   Multiple Vitamins-Minerals (MULTIVITAMIN ADULT PO) Take by mouth    Historical Provider, MD   Cholecalciferol (VITAMIN D3) 125 MCG (5000 UT) TABS Take by mouth    Historical Provider, MD         Allergies:  Patient has no known allergies. PHYSICAL EXAM:      /83   Pulse 103   Temp 99 °F (37.2 °C) (Oral)   Resp 18   Ht 5' 11.5\" (1.816 m)   Wt 270 lb (122.5 kg)   SpO2 96%   BMI 37.13 kg/m²      Airway:  Airway patent with no audible stridor    Heart:  regular rate and rhythm, No murmur noted    Lungs:  No increased work of breathing, good air exchange, clear to auscultation bilaterally, no crackles or wheezing    Abdomen:  soft, non-distended, non-tender, no rebound tenderness or guarding, normal active bowel sounds and no masses palpated    ASSESSMENT AND PLAN     Patient is a 39 y.o. male with above specified procedure planned. 1.  Patient seen and focused exam done today- no new changes since last physical exam on 1/19/21    2. Access to ancillary services are available per request of the provider. Mary Lou Rand, OLYA - CNP     1/27/2021

## 2021-01-27 NOTE — PROGRESS NOTES
Patient taken to bathroom. Patient was able to ambulate with minimal assistance to void 600mL heron urine. Patient then taken to CT with portable monitors. Patient tolerated both well. Patient had food order placed; tolerating well.

## 2021-01-27 NOTE — ANESTHESIA POSTPROCEDURE EVALUATION
Department of Anesthesiology  Postprocedure Note    Patient: Claudia Broderick  MRN: [id number]  YOB: 1980  Date of evaluation: 1/27/2021  Time:  12:53 PM     Procedure Summary     Date: 01/27/21 Room / Location: [facility]    Anesthesia Start: 0827 Anesthesia Stop: 7829    Procedures:       ENDOSCOPIC ENDONASAL APPROACH FOR RESECTION OF PITUITARY TUMOR (N/A )      NEUROENDOSCOPY WITH REMOVAL OF PITUITARY TUMOR TRANSSPHENOIDAL, STEREOTACTIC SURGERY (N/A ) Diagnosis:       Pituitary macroadenoma ([address]      (Pituitary macroadenoma ([address] [W74.2)    Surgeons: Nidia Mondragon MD; Rachael Yeung MD Responsible Provider: Verna Kern DO    Anesthesia Type: general ASA Status: 2          Anesthesia Type: general    Mayco Phase I: Mayco Score: 5    Mayco Phase II:      Last vitals: Reviewed and per EMR flowsheets.        Anesthesia Post Evaluation    Patient location during evaluation: PACU  Patient participation: complete - patient participated  Level of consciousness: awake  Pain score: 2  Airway patency: patent  Nausea & Vomiting: no nausea and no vomiting  Complications: no  Cardiovascular status: blood pressure returned to baseline  Respiratory status: acceptable

## 2021-01-28 ENCOUNTER — APPOINTMENT (OUTPATIENT)
Dept: MRI IMAGING | Age: 41
DRG: 615 | End: 2021-01-28
Attending: NEUROLOGICAL SURGERY
Payer: COMMERCIAL

## 2021-01-28 LAB
ANION GAP SERPL CALCULATED.3IONS-SCNC: 9 MMOL/L (ref 3–16)
BILIRUBIN URINE: NEGATIVE
BLOOD, URINE: ABNORMAL
BLOOD, URINE: NEGATIVE
BUN BLDV-MCNC: 19 MG/DL (ref 7–20)
CALCIUM SERPL-MCNC: 8.7 MG/DL (ref 8.3–10.6)
CHLORIDE BLD-SCNC: 106 MMOL/L (ref 99–110)
CLARITY: CLEAR
CO2: 22 MMOL/L (ref 21–32)
COLOR: YELLOW
CORTISOL TOTAL: 0.8 UG/DL
CREAT SERPL-MCNC: 1 MG/DL (ref 0.9–1.3)
EPITHELIAL CELLS, UA: ABNORMAL /HPF (ref 0–5)
EPITHELIAL CELLS, UA: NORMAL /HPF (ref 0–5)
GFR AFRICAN AMERICAN: >60
GFR NON-AFRICAN AMERICAN: >60
GLUCOSE BLD-MCNC: 144 MG/DL (ref 70–99)
GLUCOSE URINE: NEGATIVE MG/DL
HCT VFR BLD CALC: 39 % (ref 40.5–52.5)
HEMOGLOBIN: 12.8 G/DL (ref 13.5–17.5)
INR BLD: 1.19 (ref 0.86–1.14)
KETONES, URINE: NEGATIVE MG/DL
LEUKOCYTE ESTERASE, URINE: NEGATIVE
MCH RBC QN AUTO: 28.6 PG (ref 26–34)
MCHC RBC AUTO-ENTMCNC: 32.9 G/DL (ref 31–36)
MCV RBC AUTO: 86.8 FL (ref 80–100)
MICROSCOPIC EXAMINATION: NORMAL
MICROSCOPIC EXAMINATION: YES
NITRITE, URINE: NEGATIVE
OSMOLALITY URINE: 648 MOSM/KG (ref 390–1070)
OSMOLALITY URINE: 673 MOSM/KG (ref 390–1070)
OSMOLALITY URINE: 921 MOSM/KG (ref 390–1070)
OSMOLALITY URINE: 954 MOSM/KG (ref 390–1070)
OSMOLALITY: 303 MOSM/KG (ref 278–305)
PDW BLD-RTO: 13.6 % (ref 12.4–15.4)
PH UA: 6 (ref 5–8)
PH UA: 6.5 (ref 5–8)
PLATELET # BLD: 223 K/UL (ref 135–450)
PMV BLD AUTO: 8.1 FL (ref 5–10.5)
POTASSIUM SERPL-SCNC: 3.8 MMOL/L (ref 3.5–5.1)
PROTEIN UA: NEGATIVE MG/DL
PROTHROMBIN TIME: 13.8 SEC (ref 10–13.2)
RBC # BLD: 4.49 M/UL (ref 4.2–5.9)
RBC UA: ABNORMAL /HPF (ref 0–4)
RBC UA: NORMAL /HPF (ref 0–4)
RBC UA: NORMAL /HPF (ref 0–4)
RENAL EPITHELIAL, UA: ABNORMAL /HPF (ref 0–1)
SODIUM BLD-SCNC: 136 MMOL/L (ref 136–145)
SODIUM BLD-SCNC: 136 MMOL/L (ref 136–145)
SODIUM BLD-SCNC: 137 MMOL/L (ref 136–145)
SODIUM BLD-SCNC: 139 MMOL/L (ref 136–145)
SODIUM BLD-SCNC: 142 MMOL/L (ref 136–145)
SODIUM BLD-SCNC: 142 MMOL/L (ref 136–145)
SODIUM URINE: 106 MMOL/L
SODIUM URINE: 130 MMOL/L
SODIUM URINE: 149 MMOL/L
SODIUM URINE: 188 MMOL/L
SPECIFIC GRAVITY UA: 1.01 (ref 1–1.03)
SPECIFIC GRAVITY UA: 1.02 (ref 1–1.03)
URINE TYPE: ABNORMAL
URINE TYPE: NORMAL
UROBILINOGEN, URINE: 0.2 E.U./DL
WBC # BLD: 11.7 K/UL (ref 4–11)
WBC UA: ABNORMAL /HPF (ref 0–5)
WBC UA: NORMAL /HPF (ref 0–5)
WBC UA: NORMAL /HPF (ref 0–5)

## 2021-01-28 PROCEDURE — 2580000003 HC RX 258: Performed by: NURSE PRACTITIONER

## 2021-01-28 PROCEDURE — A9579 GAD-BASE MR CONTRAST NOS,1ML: HCPCS | Performed by: NURSE PRACTITIONER

## 2021-01-28 PROCEDURE — 2060000000 HC ICU INTERMEDIATE R&B

## 2021-01-28 PROCEDURE — 84300 ASSAY OF URINE SODIUM: CPT

## 2021-01-28 PROCEDURE — 70553 MRI BRAIN STEM W/O & W/DYE: CPT

## 2021-01-28 PROCEDURE — 85027 COMPLETE CBC AUTOMATED: CPT

## 2021-01-28 PROCEDURE — 97165 OT EVAL LOW COMPLEX 30 MIN: CPT

## 2021-01-28 PROCEDURE — 6370000000 HC RX 637 (ALT 250 FOR IP): Performed by: NURSE PRACTITIONER

## 2021-01-28 PROCEDURE — 97161 PT EVAL LOW COMPLEX 20 MIN: CPT

## 2021-01-28 PROCEDURE — 84295 ASSAY OF SERUM SODIUM: CPT

## 2021-01-28 PROCEDURE — 81001 URINALYSIS AUTO W/SCOPE: CPT

## 2021-01-28 PROCEDURE — 99222 1ST HOSP IP/OBS MODERATE 55: CPT | Performed by: INTERNAL MEDICINE

## 2021-01-28 PROCEDURE — 81003 URINALYSIS AUTO W/O SCOPE: CPT

## 2021-01-28 PROCEDURE — 6360000002 HC RX W HCPCS: Performed by: NURSE PRACTITIONER

## 2021-01-28 PROCEDURE — 80048 BASIC METABOLIC PNL TOTAL CA: CPT

## 2021-01-28 PROCEDURE — 83930 ASSAY OF BLOOD OSMOLALITY: CPT

## 2021-01-28 PROCEDURE — 83935 ASSAY OF URINE OSMOLALITY: CPT

## 2021-01-28 PROCEDURE — 85610 PROTHROMBIN TIME: CPT

## 2021-01-28 PROCEDURE — 6360000004 HC RX CONTRAST MEDICATION: Performed by: NURSE PRACTITIONER

## 2021-01-28 PROCEDURE — 82533 TOTAL CORTISOL: CPT

## 2021-01-28 PROCEDURE — 36415 COLL VENOUS BLD VENIPUNCTURE: CPT

## 2021-01-28 RX ORDER — POLYETHYLENE GLYCOL 3350 17 G/17G
17 POWDER, FOR SOLUTION ORAL DAILY
Status: DISCONTINUED | OUTPATIENT
Start: 2021-01-28 | End: 2021-01-29 | Stop reason: HOSPADM

## 2021-01-28 RX ORDER — SODIUM CHLORIDE 0.9 % (FLUSH) 0.9 %
10 SYRINGE (ML) INJECTION EVERY 12 HOURS SCHEDULED
Status: DISCONTINUED | OUTPATIENT
Start: 2021-01-28 | End: 2021-01-29 | Stop reason: HOSPADM

## 2021-01-28 RX ORDER — HEPARIN SODIUM 5000 [USP'U]/ML
5000 INJECTION, SOLUTION INTRAVENOUS; SUBCUTANEOUS EVERY 8 HOURS SCHEDULED
Status: DISCONTINUED | OUTPATIENT
Start: 2021-01-28 | End: 2021-01-29 | Stop reason: HOSPADM

## 2021-01-28 RX ORDER — BISACODYL 10 MG
10 SUPPOSITORY, RECTAL RECTAL DAILY PRN
Status: DISCONTINUED | OUTPATIENT
Start: 2021-01-28 | End: 2021-01-29 | Stop reason: HOSPADM

## 2021-01-28 RX ORDER — SENNA AND DOCUSATE SODIUM 50; 8.6 MG/1; MG/1
1 TABLET, FILM COATED ORAL 2 TIMES DAILY
Status: DISCONTINUED | OUTPATIENT
Start: 2021-01-28 | End: 2021-01-29 | Stop reason: HOSPADM

## 2021-01-28 RX ORDER — NALOXONE HYDROCHLORIDE 0.4 MG/ML
0.2 INJECTION, SOLUTION INTRAMUSCULAR; INTRAVENOUS; SUBCUTANEOUS PRN
Status: DISCONTINUED | OUTPATIENT
Start: 2021-01-28 | End: 2021-01-29 | Stop reason: HOSPADM

## 2021-01-28 RX ORDER — OXYCODONE HYDROCHLORIDE 5 MG/1
5 TABLET ORAL EVERY 4 HOURS PRN
Status: DISCONTINUED | OUTPATIENT
Start: 2021-01-28 | End: 2021-01-29 | Stop reason: HOSPADM

## 2021-01-28 RX ORDER — ONDANSETRON 4 MG/1
4 TABLET, ORALLY DISINTEGRATING ORAL EVERY 8 HOURS PRN
Status: DISCONTINUED | OUTPATIENT
Start: 2021-01-28 | End: 2021-01-29 | Stop reason: HOSPADM

## 2021-01-28 RX ORDER — ONDANSETRON 2 MG/ML
4 INJECTION INTRAMUSCULAR; INTRAVENOUS EVERY 6 HOURS PRN
Status: DISCONTINUED | OUTPATIENT
Start: 2021-01-28 | End: 2021-01-29 | Stop reason: HOSPADM

## 2021-01-28 RX ORDER — SODIUM CHLORIDE 0.9 % (FLUSH) 0.9 %
10 SYRINGE (ML) INJECTION PRN
Status: DISCONTINUED | OUTPATIENT
Start: 2021-01-28 | End: 2021-01-29 | Stop reason: HOSPADM

## 2021-01-28 RX ORDER — OXYCODONE HYDROCHLORIDE 5 MG/1
10 TABLET ORAL EVERY 4 HOURS PRN
Status: DISCONTINUED | OUTPATIENT
Start: 2021-01-28 | End: 2021-01-29 | Stop reason: HOSPADM

## 2021-01-28 RX ADMIN — SODIUM CHLORIDE 1500 MG: 900 INJECTION INTRAVENOUS at 12:06

## 2021-01-28 RX ADMIN — OXYMETAZOLINE HYDROCHLORIDE 2 SPRAY: 5 SPRAY NASAL at 11:10

## 2021-01-28 RX ADMIN — PANTOPRAZOLE SODIUM 40 MG: 40 TABLET, DELAYED RELEASE ORAL at 11:10

## 2021-01-28 RX ADMIN — SALINE NASAL SPRAY 1 SPRAY: 1.5 SOLUTION NASAL at 00:09

## 2021-01-28 RX ADMIN — OXYMETAZOLINE HYDROCHLORIDE 2 SPRAY: 5 SPRAY NASAL at 22:06

## 2021-01-28 RX ADMIN — SODIUM CHLORIDE: 9 INJECTION, SOLUTION INTRAVENOUS at 02:24

## 2021-01-28 RX ADMIN — OXYMETAZOLINE HYDROCHLORIDE 2 SPRAY: 5 SPRAY NASAL at 20:44

## 2021-01-28 RX ADMIN — ACETAMINOPHEN 650 MG: 325 TABLET ORAL at 02:15

## 2021-01-28 RX ADMIN — ACETAMINOPHEN 650 MG: 325 TABLET ORAL at 16:05

## 2021-01-28 RX ADMIN — SALINE NASAL SPRAY 1 SPRAY: 1.5 SOLUTION NASAL at 11:10

## 2021-01-28 RX ADMIN — METHOCARBAMOL 750 MG: 750 TABLET ORAL at 12:04

## 2021-01-28 RX ADMIN — SODIUM CHLORIDE 1500 MG: 900 INJECTION INTRAVENOUS at 17:49

## 2021-01-28 RX ADMIN — SALINE NASAL SPRAY 1 SPRAY: 1.5 SOLUTION NASAL at 16:05

## 2021-01-28 RX ADMIN — HEPARIN SODIUM 5000 UNITS: 5000 INJECTION INTRAVENOUS; SUBCUTANEOUS at 22:30

## 2021-01-28 RX ADMIN — SALINE NASAL SPRAY 1 SPRAY: 1.5 SOLUTION NASAL at 20:15

## 2021-01-28 RX ADMIN — METHOCARBAMOL 1000 MG: 100 INJECTION, SOLUTION INTRAMUSCULAR; INTRAVENOUS at 04:29

## 2021-01-28 RX ADMIN — METHOCARBAMOL 750 MG: 750 TABLET ORAL at 17:49

## 2021-01-28 RX ADMIN — Medication 10 ML: at 22:00

## 2021-01-28 RX ADMIN — SALINE NASAL SPRAY 1 SPRAY: 1.5 SOLUTION NASAL at 04:28

## 2021-01-28 RX ADMIN — ACETAMINOPHEN 650 MG: 325 TABLET ORAL at 20:47

## 2021-01-28 RX ADMIN — SODIUM CHLORIDE 1500 MG: 900 INJECTION INTRAVENOUS at 02:17

## 2021-01-28 RX ADMIN — GADOTERIDOL 20 ML: 279.3 INJECTION, SOLUTION INTRAVENOUS at 10:00

## 2021-01-28 RX ADMIN — ACETAMINOPHEN 650 MG: 325 TABLET ORAL at 11:10

## 2021-01-28 RX ADMIN — SALINE NASAL SPRAY 1 SPRAY: 1.5 SOLUTION NASAL at 12:04

## 2021-01-28 RX ADMIN — HEPARIN SODIUM 5000 UNITS: 5000 INJECTION INTRAVENOUS; SUBCUTANEOUS at 11:10

## 2021-01-28 ASSESSMENT — PAIN SCALES - GENERAL
PAINLEVEL_OUTOF10: 0
PAINLEVEL_OUTOF10: 0
PAINLEVEL_OUTOF10: 2
PAINLEVEL_OUTOF10: 2
PAINLEVEL_OUTOF10: 0

## 2021-01-28 ASSESSMENT — PAIN DESCRIPTION - LOCATION: LOCATION: HEAD

## 2021-01-28 NOTE — CONSULTS
Internal Medicine PGY- 1 Resident Consult ICU      PCP: OLYA Brown - CNP    Date of Admission: 1/27/2021    Chief Complaint:  Pituitary adenoma resection    HPI:   39 yoM with PMH of GERD, JURGEN, and pituitary adenoma who presents for pituitary adenoma resection surgery. On 3/2019, pt had headaches for 10 days, MRI obtained and pituitary macroadenoma was noted. Denies any other symptoms related to pituitary mass over this period besides headache. No visual field changes, breast discharge, changes in hand/shoe size, or libido changes. Endocrine labs wnl. Tumor was noted to be increasing in size on repeat imaging, abutting optic chiasm; and pt was referred for resection. At bedside in ICU, pt is resting comfortably and has no complaints. Had minor headache earlier that has resolved. No urinary changes. Past Medical History:          Diagnosis Date    Allergic rhinitis     Dizziness     GERD (gastroesophageal reflux disease)     Headache     Hyperglycemia     JURGEN on CPAP     Recurrent upper respiratory infection (URI)     Sleep apnea     Tinnitus        Past Surgical History:          Procedure Laterality Date    COLONOSCOPY      WISDOM TOOTH EXTRACTION         Medications Prior to Admission:      Prior to Admission medications    Medication Sig Start Date End Date Taking? Authorizing Provider   Loratadine (CLARITIN PO) Take by mouth nightly    Yes Historical Provider, MD   pantoprazole (PROTONIX) 40 MG tablet TAKE ONE TABLET BY MOUTH DAILY  Patient taking differently: nightly Do not crush or break. 1/28/20  Yes Khoi Clifford MD   Multiple Vitamins-Minerals (MULTIVITAMIN ADULT PO) Take by mouth    Historical Provider, MD   Cholecalciferol (VITAMIN D3) 125 MCG (5000 UT) TABS Take by mouth    Historical Provider, MD       Allergies: Patient has no known allergies. Social History:      TOBACCO:   reports that he has never smoked.  He has never used smokeless tobacco. ETOH:  reports current alcohol use of about 1.0 standard drinks of alcohol per week. Family History:         Problem Relation Age of Onset    Elevated Lipids Mother     Hypertension Father          PHYSICAL EXAM PERFORMED:    /72   Pulse 70   Temp 98.8 °F (37.1 °C) (Oral)   Resp 12   Ht 5' 11.5\" (1.816 m)   Wt 270 lb (122.5 kg)   SpO2 92%   BMI 37.13 kg/m²     Physical Exam  Physical Exam:  Const: Well nourished, well developed, not distressed, not diaphoretic, appears stated age  Eyes: PERRL, EOMI, no conjunctival injection, no discharge  HENT: Bandages covering surgical site. Normocephalic, atraumatic. Neck: Supple, no JVD, trachea midline  CV: Regular rate, regular rhythm, heart sounds normal, no murmur, no gallop, no rub  RESP: Clear to auscultation bilaterally, normal breath sounds, Unlabored respiratory effort, no wheezes, no rhonchi, no stridor. GI: soft, non-tender, non-distended, no masses, no rebound, bowel sounds normal  MSK/Extremities: No gross deformities appreciated, no edema noted, no tenderness to palpation  Skin: Warm, dry. No rashes, no erythema  Neuro: Alert, CNs II-XII grossly intact. Sensation and motor function of extremities grossly intact. No abnormal tone. Psych: Appropriate mood and affect. Labs:     No results for input(s): WBC, HGB, HCT, PLT in the last 72 hours. No results for input(s): NA, K, CL, CO2, BUN, CREATININE, CALCIUM, PHOS in the last 72 hours. Invalid input(s): MAGNES  No results for input(s): AST, ALT, BILIDIR, BILITOT, ALKPHOS in the last 72 hours. No results for input(s): INR in the last 72 hours. No results for input(s): Margarita Older in the last 72 hours.     Urinalysis:      Lab Results   Component Value Date    NITRU Negative 01/27/2021    WBCUA None seen 01/27/2021    RBCUA 5-10 01/27/2021    BLOODU MODERATE 01/27/2021    SPECGRAV >=1.030 01/27/2021    GLUCOSEU Negative 01/27/2021       Radiology: CXR: I have reviewed the CXR with the following interpretation:     CT Head wo Contrast   Final Result   1. Postoperative changes of the sella with soft tissue or fat packing anteriorly in the sphenoid junction. Correlate with MRI   2. Normal intracranial intra-axial and extra-axial compartments with no evidence of hemorrhage or fluid collections. 3. Evidence of chronic sinusitis with fluid level in the left, (superimposed acute left maxillary sinusitis) and sphenoid sinus is not excluded from postoperative change          ASSESSMENT & PLAN:      Pituitary macroadenoma s/p resection  Discovered on MRI for workup for ha 1 yr ago. No other sx and no abnl endocrine labs. S/p transsphenoidal resection 1/27 w/Dr. Svetlana Hoffmann.  -NSGY primary  -Unasyn 1500mg q6h prophylaxis  -IVF NS at 100 ml/hr  -scheduled robaxin and tylenol; dilaudid and roxicodone pain panel  -Nasal sprays  -q1h neuro checks  -q6h urine osm, q6h serum osm, q6h Na, q6h U/A to monitor for central DI  -1/28 and 1/29 AM cortisol  -Strict Is and Os  -1/28 post-op MRI  -PT/OT    GI Prophylaxis: home protonix 40 mg daily  Sedation: none  DVT Prophylaxis: heparin 5000 TID, SCDs  Fluids: NS at 100 ml/hr  IV access: peripheral IV   Intubated: no  Diet: DIET GENERAL    I will discuss the patient with Dr. Tyshawn Koenig MD  Internal Medicine Resident, PGY- 1  Perfect serve     Patient seen, examined and discussed with the resident and I agree with the assessment and plan.   Doing well postop  No evidence of DI  Can transfer out of ICU today to Saima Alvares MD

## 2021-01-28 NOTE — PROGRESS NOTES
PACU Transfer Note    Vitals:    01/27/21 2300   BP: 117/77   Pulse: 68   Resp: 13   Temp: 36.5   SpO2: 92%       No intake/output data recorded. Pain assessment:  none  Pain Level: 0  Patient transported to [facility] via RN with chart and belongings including Cpap machine and back pack.      1/27/2021 11:04 PM

## 2021-01-28 NOTE — PROGRESS NOTES
Pt arrived to [address] [place] [address] transported by PACU RN. VSS and pt rating pain 0/10. 4 eyes skin assessment and CHG wipe down completed per protocol. Safety precautions in place. RN will continue to monitor.

## 2021-01-28 NOTE — PROGRESS NOTES
ICU Progress Note    Admit Date: 1/27/2021  Hospital Day: 2    ICU DAY  Code:Full Code  PCP: Dion Zaldivar, OLYA - CHRIS            SUBJECTIVE:   Interval Hx:   Pt was seen and examined at bedside. Pt tolerated the procedure well. This AM pt had his breakfast, denied feeling nauseous or having a headache.        HPI:   39 yoM with PMH of GERD, JURGEN, and pituitary adenoma who presents for pituitary adenoma resection surgery. On 3/2019, pt had headaches for 10 days, MRI obtained and pituitary macroadenoma was noted. Denies any other symptoms related to pituitary mass over this period besides headache. No visual field changes, breast discharge, changes in hand/shoe size, or libido changes. Endocrine labs wnl. Tumor was noted to be increasing in size on repeat imaging, abutting optic chiasm; and pt was referred for resection.     At bedside in ICU, pt was resting comfortably and has no complaints. Had minor headache earlier that has resolved.  No urinary changes  MEDICATIONS:   Scheduled Meds:   sodium chloride flush  10 mL Intravenous 2 times per day    heparin (porcine)  5,000 Units Subcutaneous 3 times per day    sennosides-docusate sodium  1 tablet Oral BID    polyethylene glycol  17 g Oral Daily    pantoprazole  40 mg Oral Daily    ampicillin-sulbactam  1,500 mg Intravenous Q6H    oxymetazoline  2 spray Each Nostril BID    sodium chloride  1 spray Each Nostril Q4H    methocarbamol  750 mg Oral 4 times per day    acetaminophen  650 mg Oral Q6H      Continuous Infusions:   sodium chloride 100 mL/hr at 01/28/21 0224     PRN Meds:sodium chloride flush, HYDROmorphone **OR** HYDROmorphone, naloxone, oxyCODONE **OR** oxyCODONE, bisacodyl, ondansetron **OR** ondansetron  Allergies: No Known Allergies    PHYSICAL EXAM:       Vitals: /86   Pulse 67   Temp 98.8 °F (37.1 °C) (Oral)   Resp 13   Ht 5' 11.5\" (1.816 m)   Wt 270 lb (122.5 kg)   SpO2 96%   BMI 37.13 kg/m²   I/O: -q6h urine osm, q6h serum osm, q6h Na, q6h U/A to monitor for central DI  -1/28 and 1/29 AM cortisol  -Strict Is and Os  -1/28 post-op MRI brain: no residual tumor, moderate layering fluid in sphenoidal sinuses and paranasal sinus inflammatory disease.    -PT/OT    Code Status:Full Code  FEN: Regular Diet  PPX: Heprin SCD   DISPO: F    W/D/W/A  This pt was discussed with Dr. Isma Gill MD    -----------------------------  Rico Bonds M.D.   PGY-1 Internal Medicine  Pager: [phone]  1/28/2021 7:28 AM

## 2021-01-28 NOTE — PLAN OF CARE
Problem: Pain:  Goal: Patient's pain/discomfort is manageable  Description: Patient's pain/discomfort is manageable  Outcome: Ongoing   Patient's pain is assessed every shift, post procedure, prn, or any changes in status. Pt's pain is assessed using 0-10 pain scale. Pt understands how to communicate pain to RN using this scale. Pt understands to notify RN with onset of new pain. Pt's pain has been controlled this shift. Pt is receiving Tylenol for pain. Problem: Falls - Risk of:  Goal: Will remain free from falls  Description: Will remain free from falls  Outcome: Ongoing   Pt is free of falls at this time. Bed wheels are locked, bed alarm is on, bed is in lowest position. Call light is within reach. Will cont to monitor.

## 2021-01-28 NOTE — PROGRESS NOTES
Pt remains in bed, resting with no needs at this time. Facial dressing changed with minimal bloody discharge from nose. Pt reports pain 0/10 at this time. RN will monitor.

## 2021-01-28 NOTE — PROGRESS NOTES
PACU Transfer Note    Vitals:    01/27/21 2126   BP: 122/80   Pulse: 82   Resp: 18   Temp: 98.6 °F (37 °C)   SpO2: 95%   Report given to RN. Awaiting room cleaning.      In: 887 [I.V.:887]  Out: 750 [Urine:750]    Pain assessment:  headaches  Pain Level: 0    Report given to Receiving unit RN.    1/27/2021 9:27 PM

## 2021-01-28 NOTE — CARE COORDINATION
Case Management Assessment           Initial Evaluation                Date / Time of Evaluation: 1/28/2021 12:01 PM                 Assessment Completed by: Caitlin Taylor     Spoke with patient at bedside regarding discharge needs. Pt is from home with his spouse who can be at home 24/7 at d/c. She will be able to transport him to home at d/c. They live in a two story home with about 3-4 steps to enter but he will be able to stay on the main level if need be. He had no services or DME prior and denies needs from CM upon d/c. Patient Name: Janice Raines     YOB: 1980  Diagnosis: Pituitary macroadenoma [facility]) [D35.2]  Pituitary adenoma [facility]) [D35.2]     Date / Time: 1/27/2021  6:27 AM    Patient Admission Status: Inpatient    If patient is discharged prior to next notation, then this note serves as note for discharge by case management.      Current PCP: OLYA Can CNP  Clinic Patient: No    Chart Reviewed: Yes  Patient/ Family Interviewed: Yes    Initial assessment completed at bedside with: patient    Hospitalization in the last 30 days: No    Emergency Contacts:  Extended Emergency Contact Information  Primary Emergency Contact: [facility]  Address: [address], [address] Phone: [phone]  Mobile Phone: [phone]  Relation: Spouse  Secondary Emergency Contact: [address] Phone: [phone]  Relation: Parent    Advance Directives:   Code Status: [address]: No  Agent: NA  Contact Number: NA      Financial  Payor: Donna Garza 150 / Plan: Lauren Morales PPO / Product Type: *No Product type* /     Pre-cert required for SNF: Yes    Pharmacy    [address] [facility], [address] [facility]  [address]  [place] [address]  Phone: [phone] Fax: [phone] Potential assistance Purchasing Medications: Potential Assistance Purchasing Medications: No  Does Patient want to participate in local refill/ meds to beds program?: Not Assessed    Meds To Beds General Rules:  1. Can ONLY be done Monday- Friday between 8:30am-5pm  2. Prescription(s) must be in pharmacy by 3pm to be filled same day  3. Copy of patient's insurance/ prescription drug card and patient face sheet must be sent along with the prescription(s)  4. Cost of Rx cannot be added to hospital bill. If financial assistance is needed, please contact unit  or ;  or  CANNOT provide pharmacy voucher for patients co-pays  5.  Patients can then  the prescription on their way out of the hospital at discharge, or pharmacy can deliver to the bedside if staff is available. (payment due at time of pick-up or delivery - cash, check, or card accepted)     Able to afford home medications/ co-pay costs: Yes    ADLS  Support Systems: Spouse/Significant Other, Family Members    PT AM-PAC:   /24  OT AM-PAC:   /24    [place]: two level home  Steps: 3-4 steps to enter    Plans to RETURN to current housing: Yes  Barriers to RETURNING to current housing: none noted    DISCHARGE PLAN:  Disposition: Home- No Services Needed    Transportation PLAN for discharge: family     Factors facilitating achievement of predicted outcomes: Family support    Barriers to discharge: Pain    Additional Case Management Notes: NA    The Plan for Transition of Care is related to the following treatment goals of Pituitary macroadenoma ([facility] [address] [D35.2]  Pituitary adenoma ([facility] [address] [D35.2]    The Patient and/or patient representative Dina Metz and his family were provided with a choice of provider and agrees with the discharge plan Not Indicated Freedom of choice list was provided with basic dialogue that supports the patient's individualized plan of care/goals and shares the quality data associated with the providers.  Not Indicated    Care Transition patient: Patito Yee RN  [facility]  Case Management Department  Ph: [phone]   Fax: [phone]

## 2021-01-28 NOTE — PROGRESS NOTES
NEUROSURGERY POST-OP PROGRESS NOTE    Patient Name: Lloyd Wade YOB: 1980   Sex: Male Age: 39 yrs     Medical Record Number: [id number] Acct Number: [de-identified]   Room Number: 4794/6078-34 Hospital Day: Hospital Day: 2     Interval History:  Post-operative Day# 1 s/p Procedure(s) (LRB):  ENDOSCOPIC ENDONASAL APPROACH FOR RESECTION OF PITUITARY TUMOR (N/A)  NEUROENDOSCOPY WITH REMOVAL OF PITUITARY TUMOR TRANSSPHENOIDAL, STEREOTACTIC SURGERY (N/A)    Subjective: pt reports stuffiness and headache. Has some bloody drainage from nose when leans over.     Objective:    VITAL SIGNS   /76   Pulse 74   Temp 98.8 °F (37.1 °C) (Oral)   Resp 12   Ht 5' 11.5\" (1.816 m)   Wt 270 lb (122.5 kg)   SpO2 96%   BMI 37.13 kg/m²    Height Height: 5' 11.5\" (181.6 cm)   Weight Weight: 270 lb (122.5 kg)        Allergies No Known Allergies   NPO Status DIET GENERAL;   Isolation No active isolations     LABS   Basic Metabolic Profile Recent Labs     01/28/21  0025 01/28/21  0508    137   CL  --  106   CO2  --  22   BUN  --  19   CREATININE  --  1.0   GLUCOSE  --  144*      Complete Blood Count Recent Labs     01/28/21  0508   WBC 11.7*   RBC 4.49      Coagulation Studies Recent Labs     01/28/21  0508   INR 1.19*        MEDICATIONS   Inpatient Medications     sodium chloride flush, 10 mL, Intravenous, 2 times per day    heparin (porcine), 5,000 Units, Subcutaneous, 3 times per day    sennosides-docusate sodium, 1 tablet, Oral, BID    polyethylene glycol, 17 g, Oral, Daily    pantoprazole, 40 mg, Oral, Daily    ampicillin-sulbactam, 1,500 mg, Intravenous, Q6H    oxymetazoline, 2 spray, Each Nostril, BID    sodium chloride, 1 spray, Each Nostril, Q4H    [COMPLETED] methocarbamol IVPB, 1,000 mg, Intravenous, Q8H **FOLLOWED BY** methocarbamol, 750 mg, Oral, 4 times per day    acetaminophen, 650 mg, Oral, Q6H   Infusions    sodium chloride 100 mL/hr at 01/28/21 0224      Antibiotics Recent Abx Admin                   ampicillin-sulbactam (UNASYN) 1500 mg IVPB minibag (mg) 1,500 mg New Bag 01/28/21 0217     1,500 mg New Bag 01/27/21 2104    ceFAZolin (ANCEF) 2000 mg in dextrose 3 % 50 mL IVPB (duplex) (g) 2 g Given 01/27/21 0844                 Neurologic Exam:  Mental status: awake and alert and oriented x4    Cranial Nerves:  II: Visual acuity not tested, visual fields full, denies new visual changes / diplopia  III, IV, VI: PERRL, 3 mm bilaterally, EOMI, no nystagmus noted  V: Facial sensation intact bilaterally to touch  VII: Face symmetric  VIII: Hearing intact bilaterally to spoken voice  IX: Palate movement equal bilaterally  XI: Shoulder shrug equal bilaterally  XII: Tongue midline      Musculoskeletal:   Gait: Not tested   Tone: normal  Sensory: intact to all extremities  Motor strength:    Right  Left    Right  Left    Deltoid  5 5  Hip Flex  5 5   Biceps  5 5  Knee Extensors  5 5   Triceps  5 5  Knee Flexors  5 5   Wrist Ext  5 5  Ankle Dorsiflex. 5 5   Wrist Flex  5 5  Ankle Plantarflex. 5 5   Handgrip  5 5  Ext Deven Longus  5 5   Thumb Ext  5 5           Respiratory:  Unlabored respiratory pattern    Abdomen:   Soft, ND       Cardiovascular:  Warm, well perfused    Assessment   Patient is a 38 yo M s/p Procedure(s) (LRB):  ENDOSCOPIC ENDONASAL APPROACH FOR RESECTION OF PITUITARY TUMOR (N/A)  NEUROENDOSCOPY WITH REMOVAL OF PITUITARY TUMOR TRANSSPHENOIDAL, STEREOTACTIC SURGERY (N/A) per Dr. Christian Mcdanielsune:  1. Neurologic exam frequency:q4h  2. Mobility:PT/OT eval  3. q 6 hr serum and urine labs continue  4. Abx: unasyn while in hospital and Augmentin when she goes home  5. DVT Prophylaxis: SCDs and heparin  6. Bowel Regimen: senna and glycolax  7. Pain control:danita and robaxin  8. Dispo Planning:transfer to [address] [place] [address]    Patient was seen with Dr. Halie Nava who agrees with above assessment and plan. Electronically signed by:  Teresa Valdovinos, 1/28/2021 7:09 AM Neurosurgery Nurse Practitioner  [phone]

## 2021-01-28 NOTE — PROGRESS NOTES
Occupational Therapy   Occupational Therapy Initial Assessment/Tx  Date: 2021   Patient Name: Whit Jaime  MRN: [id number]     : 1980    Date of Service: 2021    Discharge Recommendations: Whit Jaime scored a 24/24 on the AM-PAC ADL Inpatient form. Current research shows that an AM-PAC score of 18 or greater is typically associated with a discharge to the patient's home setting. At this time, this patient demonstrates the endurance and safety to discharge home with supervision. .   Please see assessment section for further patient specific details. If patient discharges prior to next session this note will serve as a discharge summary. Please see below for the latest assessment towards goals. OT Equipment Recommendations  Equipment Needed: No    Assessment   Assessment: Pt appears this date near functional baseline despite mild headache and bleeding from nose with movement. Pt expected to make full recovery and was active and independent at baseline. Pt will have supervision from wife and two daughters at discharge. Pt is safe to d/c home with 24-hr supervision from OT standpoint and has no further acute OT needs. Prognosis: Good  Decision Making: Low Complexity  OT Education: OT Role;IADL Safety;Plan of Care;Energy Conservation; ADL Adaptive Strategies;Transfer Training  Patient Education: Pt verb understanding  REQUIRES OT FOLLOW UP: No  Activity Tolerance  Activity Tolerance: Patient Tolerated treatment well  Safety Devices  Safety Devices in place: Yes  Type of devices: All fall risk precautions in place;Call light within reach; Chair alarm in place; Left in chair;Nurse notified           Patient Diagnosis(es): The encounter diagnosis was Pituitary macroadenoma ([facility] [address] has a past medical history of Allergic rhinitis, Dizziness, GERD (gastroesophageal reflux disease), Headache, Hyperglycemia, JURGEN on CPAP, Recurrent upper respiratory infection (URI), Sleep apnea, and Tinnitus. has a past surgical history that includes [place] tooth extraction; Colonoscopy; craniotomy (N/A, 1/27/2021); and Nasal sinus surgery (N/A, 1/27/2021). Restrictions       Subjective   General  Chart Reviewed: Yes  Additional Pertinent Hx: Pt is 39 y.o. male that had an incidental finding of pituitary macroadenoma 1 year ago during workup for HA. Pt presented for to [facility] for the following procedure: ENDOSCOPIC ENDONASAL APPROACH FOR RESECTION OF PITUITARY TUMOR, POSSIBLE LUMBAR DRAIN, POSSIBLE ABDOMINAL FAT GRAFTNEUROENDOSCOPY WITH REMOVAL OF PITUITARY TUMOR TRANSSPHENOIDAL, STEREOTACTIC SURGERY. Pt with PMHx of Allergic rhinitis, Dizziness, GERD (gastroesophageal reflux disease), Headache, Hyperglycemia, JURGEN on CPAP, Recurrent upper respiratory infection (URI), Sleep apnea, and Tinnitus. Referring Practitioner: OLYA Singh CNP  Diagnosis: Pituitary macroadenoma  Subjective  Subjective: Pt lying supine upon OT arrival. Pt agreeable to OT evaluation. Pt very pleasant throughout session.   Patient Currently in Pain: Yes(2/10 headache pain, RN notified)  Pain Assessment  Pain Assessment: 0-10  Pain Level: 2  Pain Location: Head  RASS Score: Alert and calm  POSS Score (Patient Ctrl Analgesia): 1  Vital Signs  Temp: 98 °F (36.7 °C)  Temp Source: Oral  Pulse: 81  Heart Rate Source: Monitor  Resp: 17  BP: 122/71  BP Location: Right upper arm  MAP (mmHg): 85  Patient Position: Semi fowlers  Level of Consciousness: Alert (0)  MEWS Score: 1  Patient Currently in Pain: Yes(2/10 headache pain, RN notified)  Oxygen Therapy  SpO2: 98 %  Pulse Oximeter Device Mode: Continuous  Pulse Oximeter Device Location: Right  O2 Device: None (Room air)  Social/Functional History  Social/Functional History Lives With: Spouse(& 2 daughters)  Type of Home: House  Home Layout: One level  Home Access: Stairs to enter with rails  Entrance Stairs - Number of Steps: 4  Bathroom Shower/Tub: Walk-in shower  Bathroom Toilet: Standard  ADL Assistance: Independent  Ambulation Assistance: Independent  Transfer Assistance: Independent  Active : Yes  Occupation: Full time employment  Type of occupation: IT  Additional Comments: wife can provide 24-hr       Objective        Orientation  Overall Orientation Status: Within Normal Limits  Observation/Palpation  Posture: Good  Balance  Sitting Balance: Independent  Standing Balance: Supervision  Standing Balance  Time: up to 10 min  Activity: up around room and in hallway  Functional Mobility  Functional - Mobility Device: No device  Activity: To/from bathroom; Other  Assist Level: Supervision  Toilet Transfers  Toilet - Technique: Ambulating  Equipment Used: Standard toilet  Toilet Transfer: Independent  ADL  Feeding: Independent  Grooming: Independent  UE Dressing: Independent  LE Dressing: Independent  Toileting: Independent  Tone RUE  RUE Tone: Normotonic  Tone LUE  LUE Tone: Normotonic  Coordination  Movements Are Fluid And Coordinated: Yes     Bed mobility  Supine to Sit: Modified independent(HOB slightly elevated)  Scooting: Independent  Transfers  Sit to stand: Independent  Stand to sit: Independent  Vision - Basic Assessment  Prior Vision: No visual deficits  Cognition  Overall Cognitive Status: WNL                 LUE AROM (degrees)  LUE AROM : WNL  Left Hand AROM (degrees)  Left Hand AROM: WNL  RUE AROM (degrees)  RUE AROM : WNL  Right Hand AROM (degrees)  Right Hand AROM: WNL              Treatment included functional transfer training, ADLs, and patient education.            Plan        G-Code     OutComes Score                                                  AM-PAC Score        AM-PAC Inpatient Daily Activity Raw Score: 24 (01/28/21 9446) AM-PAC Inpatient ADL T-Scale Score : 57.54 (01/28/21 1331)  ADL Inpatient CMS 0-100% Score: 0 (01/28/21 1331)  ADL Inpatient CMS G-Code Modifier : CH (01/28/21 1331)    Goals          Therapy Time   Individual Concurrent Group Co-treatment   Time In 1310         Time Out 1325         Minutes 15         Timed Code Treatment Minutes: 0 Minutes(+ 15 min OT eval)       Dacia Wilcox, OT

## 2021-01-28 NOTE — PROGRESS NOTES
Physical Therapy    Facility/Department: [address] [place] [address] ICU  Initial Assessment/Discharge    NAME: Claudia Broderick  : 1980  MRN: [id number]    Date of Service: 2021    Discharge Recommendations: Claudia Broderick scored a 24/24 on the AM-PAC short mobility form. At this time, no further PT is recommended upon discharge. Recommend patient returns to prior setting with prior services. PT Equipment Recommendations  Equipment Needed: No    Assessment   Assessment: Pt functioning at baseline. Safe to go home upon D/C. No further PT indicated at this time. D/C PT. Decision Making: Low Complexity  PT Education: PT Role  No Skilled PT: At baseline function  REQUIRES PT FOLLOW UP: No  Activity Tolerance  Activity Tolerance: Patient Tolerated treatment well       Patient Diagnosis(es): The encounter diagnosis was Pituitary macroadenoma ([facility] [address] has a past medical history of Allergic rhinitis, Dizziness, GERD (gastroesophageal reflux disease), Headache, Hyperglycemia, JURGEN on CPAP, Recurrent upper respiratory infection (URI), Sleep apnea, and Tinnitus. has a past surgical history that includes [place] tooth extraction; Colonoscopy; craniotomy (N/A, 2021); and Nasal sinus surgery (N/A, 2021). Restrictions  Position Activity Restriction  Other position/activity restrictions: up as tolerated, ambulate  Vision/Hearing  Vision: Within Functional Limits  Hearing: Within functional limits     Subjective  General  Additional Pertinent Hx: PMH: pituitary tumor. Pt admitted forENDOSCOPIC ENDONASAL APPROACH FOR RESECTION OF PITUITARY TUMOR  Family / Caregiver Present: No  Referring Practitioner: Michel Perez  Referral Date : 21  Diagnosis: pituitary tumor  Follows Commands: Within Functional Limits  Subjective  Subjective: Pt supine in bed & agreeable to PT.   Pain Screening  Patient Currently in Pain: Yes(2/10 headache)  Vital Signs  Patient Currently in Pain: Yes(2/10 headache)       Orientation

## 2021-01-28 NOTE — PLAN OF CARE
Problem: Falls - Risk of:  Goal: Will remain free from falls  Description: Will remain free from falls  1/28/2021 1243 by Sun Munoz RN  Outcome: Met This Shift  1/28/2021 0255 by Francesca Dill RN  Outcome: Ongoing  Goal: Absence of physical injury  Description: Absence of physical injury  1/28/2021 1243 by Sun Munoz RN  Outcome: Met This Shift  1/28/2021 0255 by Francesca Dill RN  Outcome: Ongoing

## 2021-01-29 VITALS
HEART RATE: 71 BPM | SYSTOLIC BLOOD PRESSURE: 137 MMHG | TEMPERATURE: 97.9 F | OXYGEN SATURATION: 95 % | BODY MASS INDEX: 36.57 KG/M2 | RESPIRATION RATE: 17 BRPM | HEIGHT: 72 IN | DIASTOLIC BLOOD PRESSURE: 82 MMHG | WEIGHT: 270 LBS

## 2021-01-29 PROBLEM — Z98.890 S/P SELECTIVE TRANSSPHENOIDAL PITUITARY ADENOMECTOMY: Status: ACTIVE | Noted: 2021-01-29

## 2021-01-29 PROBLEM — Z86.018 S/P SELECTIVE TRANSSPHENOIDAL PITUITARY ADENOMECTOMY: Status: ACTIVE | Noted: 2021-01-29

## 2021-01-29 LAB
ANION GAP SERPL CALCULATED.3IONS-SCNC: 14 MMOL/L (ref 3–16)
BASOPHILS ABSOLUTE: 0.1 K/UL (ref 0–0.2)
BASOPHILS RELATIVE PERCENT: 0.8 %
BILIRUBIN URINE: NEGATIVE
BILIRUBIN URINE: NEGATIVE
BLOOD, URINE: ABNORMAL
BLOOD, URINE: ABNORMAL
BUN BLDV-MCNC: 18 MG/DL (ref 7–20)
CALCIUM SERPL-MCNC: 8.3 MG/DL (ref 8.3–10.6)
CHLORIDE BLD-SCNC: 110 MMOL/L (ref 99–110)
CLARITY: CLEAR
CLARITY: CLEAR
CO2: 21 MMOL/L (ref 21–32)
COLOR: YELLOW
COLOR: YELLOW
CORTISOL TOTAL: 11.6 UG/DL
CORTISOL TOTAL: 7.5 UG/DL
CREAT SERPL-MCNC: 1 MG/DL (ref 0.9–1.3)
EOSINOPHILS ABSOLUTE: 0.2 K/UL (ref 0–0.6)
EOSINOPHILS RELATIVE PERCENT: 2.9 %
GFR AFRICAN AMERICAN: >60
GFR NON-AFRICAN AMERICAN: >60
GLUCOSE BLD-MCNC: 108 MG/DL (ref 70–99)
GLUCOSE URINE: NEGATIVE MG/DL
GLUCOSE URINE: NEGATIVE MG/DL
HCT VFR BLD CALC: 37.4 % (ref 40.5–52.5)
HEMOGLOBIN: 12.6 G/DL (ref 13.5–17.5)
KETONES, URINE: NEGATIVE MG/DL
KETONES, URINE: NEGATIVE MG/DL
LEUKOCYTE ESTERASE, URINE: NEGATIVE
LEUKOCYTE ESTERASE, URINE: NEGATIVE
LYMPHOCYTES ABSOLUTE: 1.7 K/UL (ref 1–5.1)
LYMPHOCYTES RELATIVE PERCENT: 26.4 %
MCH RBC QN AUTO: 29 PG (ref 26–34)
MCHC RBC AUTO-ENTMCNC: 33.8 G/DL (ref 31–36)
MCV RBC AUTO: 85.9 FL (ref 80–100)
MICROSCOPIC EXAMINATION: YES
MICROSCOPIC EXAMINATION: YES
MONOCYTES ABSOLUTE: 0.4 K/UL (ref 0–1.3)
MONOCYTES RELATIVE PERCENT: 5.6 %
NEUTROPHILS ABSOLUTE: 4 K/UL (ref 1.7–7.7)
NEUTROPHILS RELATIVE PERCENT: 64.3 %
NITRITE, URINE: NEGATIVE
NITRITE, URINE: NEGATIVE
OSMOLALITY URINE: 830 MOSM/KG (ref 390–1070)
OSMOLALITY URINE: 906 MOSM/KG (ref 390–1070)
OSMOLALITY: 299 MOSM/KG (ref 278–305)
PDW BLD-RTO: 13.4 % (ref 12.4–15.4)
PH UA: 6 (ref 5–8)
PH UA: 6 (ref 5–8)
PLATELET # BLD: 202 K/UL (ref 135–450)
PMV BLD AUTO: 7.7 FL (ref 5–10.5)
POTASSIUM REFLEX MAGNESIUM: 3.9 MMOL/L (ref 3.5–5.1)
PROTEIN UA: NEGATIVE MG/DL
PROTEIN UA: NEGATIVE MG/DL
RBC # BLD: 4.36 M/UL (ref 4.2–5.9)
RBC UA: NORMAL /HPF (ref 0–4)
RBC UA: NORMAL /HPF (ref 0–4)
SODIUM BLD-SCNC: 141 MMOL/L (ref 136–145)
SODIUM BLD-SCNC: 145 MMOL/L (ref 136–145)
SODIUM BLD-SCNC: 145 MMOL/L (ref 136–145)
SODIUM URINE: 151 MMOL/L
SODIUM URINE: 172 MMOL/L
SPECIFIC GRAVITY UA: 1.02 (ref 1–1.03)
SPECIFIC GRAVITY UA: >=1.03 (ref 1–1.03)
URINE TYPE: ABNORMAL
URINE TYPE: ABNORMAL
UROBILINOGEN, URINE: 0.2 E.U./DL
UROBILINOGEN, URINE: 0.2 E.U./DL
WBC # BLD: 6.3 K/UL (ref 4–11)
WBC UA: NORMAL /HPF (ref 0–5)
WBC UA: NORMAL /HPF (ref 0–5)

## 2021-01-29 PROCEDURE — 84295 ASSAY OF SERUM SODIUM: CPT

## 2021-01-29 PROCEDURE — 80048 BASIC METABOLIC PNL TOTAL CA: CPT

## 2021-01-29 PROCEDURE — 85025 COMPLETE CBC W/AUTO DIFF WBC: CPT

## 2021-01-29 PROCEDURE — 6370000000 HC RX 637 (ALT 250 FOR IP): Performed by: NURSE PRACTITIONER

## 2021-01-29 PROCEDURE — 84300 ASSAY OF URINE SODIUM: CPT

## 2021-01-29 PROCEDURE — 81001 URINALYSIS AUTO W/SCOPE: CPT

## 2021-01-29 PROCEDURE — 36415 COLL VENOUS BLD VENIPUNCTURE: CPT

## 2021-01-29 PROCEDURE — 2580000003 HC RX 258: Performed by: NURSE PRACTITIONER

## 2021-01-29 PROCEDURE — 83935 ASSAY OF URINE OSMOLALITY: CPT

## 2021-01-29 PROCEDURE — 82533 TOTAL CORTISOL: CPT

## 2021-01-29 PROCEDURE — 83930 ASSAY OF BLOOD OSMOLALITY: CPT

## 2021-01-29 PROCEDURE — 6360000002 HC RX W HCPCS: Performed by: NURSE PRACTITIONER

## 2021-01-29 RX ORDER — OXYCODONE HYDROCHLORIDE 5 MG/1
5 TABLET ORAL EVERY 6 HOURS PRN
Qty: 28 TABLET | Refills: 0 | Status: SHIPPED | OUTPATIENT
Start: 2021-01-29 | End: 2021-02-05

## 2021-01-29 RX ORDER — AMOXICILLIN AND CLAVULANATE POTASSIUM 875; 125 MG/1; MG/1
1 TABLET, FILM COATED ORAL 2 TIMES DAILY
Qty: 14 TABLET | Refills: 0 | Status: SHIPPED | OUTPATIENT
Start: 2021-01-29 | End: 2021-02-05

## 2021-01-29 RX ORDER — SENNA AND DOCUSATE SODIUM 50; 8.6 MG/1; MG/1
1 TABLET, FILM COATED ORAL 2 TIMES DAILY
COMMUNITY
Start: 2021-01-29 | End: 2021-10-22

## 2021-01-29 RX ORDER — METHOCARBAMOL 750 MG/1
750 TABLET, FILM COATED ORAL 4 TIMES DAILY PRN
Qty: 20 TABLET | Refills: 0 | Status: SHIPPED | OUTPATIENT
Start: 2021-01-29 | End: 2021-02-08

## 2021-01-29 RX ORDER — POLYETHYLENE GLYCOL 3350 17 G/17G
17 POWDER, FOR SOLUTION ORAL DAILY
Qty: 527 G | Refills: 1 | COMMUNITY
Start: 2021-01-30 | End: 2021-03-01

## 2021-01-29 RX ADMIN — SODIUM CHLORIDE 1500 MG: 900 INJECTION INTRAVENOUS at 00:30

## 2021-01-29 RX ADMIN — SALINE NASAL SPRAY 1 SPRAY: 1.5 SOLUTION NASAL at 04:00

## 2021-01-29 RX ADMIN — METHOCARBAMOL 750 MG: 750 TABLET ORAL at 00:40

## 2021-01-29 RX ADMIN — PANTOPRAZOLE SODIUM 40 MG: 40 TABLET, DELAYED RELEASE ORAL at 07:57

## 2021-01-29 RX ADMIN — ACETAMINOPHEN 650 MG: 325 TABLET ORAL at 01:38

## 2021-01-29 RX ADMIN — SALINE NASAL SPRAY 1 SPRAY: 1.5 SOLUTION NASAL at 00:30

## 2021-01-29 RX ADMIN — SALINE NASAL SPRAY 1 SPRAY: 1.5 SOLUTION NASAL at 11:27

## 2021-01-29 RX ADMIN — ACETAMINOPHEN 650 MG: 325 TABLET ORAL at 07:57

## 2021-01-29 RX ADMIN — SODIUM CHLORIDE 1500 MG: 900 INJECTION INTRAVENOUS at 11:26

## 2021-01-29 RX ADMIN — DOCUSATE SODIUM 50 MG AND SENNOSIDES 8.6 MG 1 TABLET: 8.6; 5 TABLET, FILM COATED ORAL at 07:57

## 2021-01-29 RX ADMIN — HEPARIN SODIUM 5000 UNITS: 5000 INJECTION INTRAVENOUS; SUBCUTANEOUS at 05:42

## 2021-01-29 RX ADMIN — SALINE NASAL SPRAY 1 SPRAY: 1.5 SOLUTION NASAL at 07:57

## 2021-01-29 RX ADMIN — Medication 10 ML: at 08:02

## 2021-01-29 RX ADMIN — METHOCARBAMOL 750 MG: 750 TABLET ORAL at 05:44

## 2021-01-29 RX ADMIN — OXYMETAZOLINE HYDROCHLORIDE 2 SPRAY: 5 SPRAY NASAL at 08:01

## 2021-01-29 RX ADMIN — SODIUM CHLORIDE 1500 MG: 900 INJECTION INTRAVENOUS at 05:49

## 2021-01-29 RX ADMIN — POLYETHYLENE GLYCOL 3350 17 G: 17 POWDER, FOR SOLUTION ORAL at 07:57

## 2021-01-29 RX ADMIN — METHOCARBAMOL 750 MG: 750 TABLET ORAL at 11:27

## 2021-01-29 ASSESSMENT — PAIN SCALES - GENERAL
PAINLEVEL_OUTOF10: 0
PAINLEVEL_OUTOF10: 2
PAINLEVEL_OUTOF10: 0

## 2021-01-29 NOTE — DISCHARGE SUMMARY
Discharge Summary    Date of Admission: 1/27/2021  6:27 AM  Date of Discharge: 1/29/2021  Admission Diagnosis: Pituitary macroadenoma [facility]) [D35.2  Discharge Diagnosis: Same   Condition on Discharge: good  Attending for Admission: Yi Ayoub MD  Procedures: Procedure(s) (LRB):  ENDOSCOPIC ENDONASAL APPROACH FOR RESECTION OF PITUITARY TUMOR (N/A)  NEUROENDOSCOPY WITH REMOVAL OF PITUITARY TUMOR TRANSSPHENOIDAL, STEREOTACTIC SURGERY (N/A)  Consults: IP CONSULT TO CRITICAL CARE  IP CONSULT TO HOSPITALIST    Reason for Admission:  Janice Raines is a 39 y.o. male patient who was admitted to the hospital for resection of pituitary macroadenoma. He underwent the procedure listed above on 1/27/2021. Hospital Course:  After surgery,  He complained of stuffiness and headache. The pain was well-controlled on oral medications. There was no drainage from nose. Prior to discharge He was eating well, urinating and ambulating with a steady gait.     Discharge Vitals/Labs:  BP (!) 139/90   Pulse 64   Temp 97.8 °F (36.6 °C) (Oral)   Resp 9   Ht 5' 11.5\" (1.816 m)   Wt 270 lb (122.5 kg)   SpO2 95%   BMI 37.13 kg/m²   CBC:   Lab Results   Component Value Date    WBC 6.3 01/29/2021    RBC 4.36 01/29/2021    HGB 12.6 01/29/2021    HCT 37.4 01/29/2021    MCV 85.9 01/29/2021    MCH 29.0 01/29/2021    MCHC 33.8 01/29/2021    RDW 13.4 01/29/2021     01/29/2021    MPV 7.7 01/29/2021     CMP:    Lab Results   Component Value Date     01/29/2021     01/29/2021    K 3.9 01/29/2021     01/29/2021    CO2 21 01/29/2021    BUN 18 01/29/2021    CREATININE 1.0 01/29/2021    GFRAA >60 01/29/2021    GFRAA >60 01/24/2011    AGRATIO 2.2 09/24/2019    LABGLOM >60 01/29/2021    GLUCOSE 108 01/29/2021    PROT 6.7 09/24/2019    PROT 7.0 01/24/2011    LABALBU 4.6 09/24/2019    CALCIUM 8.3 01/29/2021    BILITOT 0.6 09/24/2019    ALKPHOS 104 09/24/2019    AST 29 09/24/2019    ALT 39 09/24/2019 Discharge Medications: The patient suffers from a major neurological surgery that pain cannot be managed within an average of 30 MED per day. Severe acute postoperative pain is the reason for exceeding the 30 MED average, and the prescription reflects the same dosage patient received while inpatient, which is the lowest dose consistent with the patients medical condition. Non-opioid treatment options have been considered prior to prescribing opioids, and the patient has been advised of the benefits and risks of the opioid (including the potential for addiction). Medication List      START taking these medications    amoxicillin-clavulanate 875-125 MG per tablet  Commonly known as: AUGMENTIN  Take 1 tablet by mouth 2 times daily for 7 days     methocarbamol 750 MG tablet  Commonly known as: ROBAXIN  Take 1 tablet by mouth 4 times daily as needed (spasms)     oxyCODONE 5 MG immediate release tablet  Commonly known as: ROXICODONE  Take 1 tablet by mouth every 6 hours as needed for Pain for up to 7 days. polyethylene glycol 17 g packet  Commonly known as: GLYCOLAX  Take 17 g by mouth daily  Start taking on: January 30, 2021     sennosides-docusate sodium 8.6-50 MG tablet  Commonly known as: SENOKOT-S  Take 1 tablet by mouth 2 times daily     sodium chloride 0.65 % nasal spray  Commonly known as: OCEAN, BABY AYR  1 spray by Nasal route every 4 hours        CHANGE how you take these medications    pantoprazole 40 MG tablet  Commonly known as: PROTONIX  TAKE ONE TABLET BY MOUTH DAILY  What changed: See the new instructions.         CONTINUE taking these medications    CLARITIN PO     MULTIVITAMIN ADULT PO     Vitamin D3 125 MCG (5000 UT) Tabs           Where to Get Your Medications      You can get these medications from any pharmacy    Bring a paper prescription for each of these medications  · amoxicillin-clavulanate 875-125 MG per tablet  · methocarbamol 750 MG tablet  · oxyCODONE 5 MG immediate release tablet · sodium chloride 0.65 % nasal spray  You don't need a prescription for these medications  · polyethylene glycol 17 g packet  · sennosides-docusate sodium 8.6-50 MG tablet         Discharge Destination:  The patient was discharged to Home. Follow-up:  The patient is to follow-up with Tay Schultz. Sumit Acevedo MD in the office in 2 weeks      Discharge Instructions:   Verbal and written discharge instructions were given to the patient at the time of discharge. Electronically signed by:  Leopoldo Bud, APRN-CNP, 1/29/2021 10:01 AM  [phone]

## 2021-01-29 NOTE — CARE COORDINATION
Case Management Assessment            Discharge Note                    Date / Time of Note: 1/29/2021 1:53 PM                  Discharge Note Completed by: Noemi Grover    Patient Name: Kalyani Salvador   YOB: 1980  Diagnosis: Pituitary macroadenoma [facility]) [D35.2]  Pituitary adenoma ([facility] [address] [D35.2]   Date / Time: 1/27/2021  6:27 AM    Current PCP: OLYA Graves CNP  Clinic patient: No    Hospitalization in the last 30 days: No    Advance Directives:  Code Status: Full Code  [place] DNR form completed and on chart: Not Indicated    Financial:  Payor: Emma Galan / Plan: Brian Boothe PPO / Product Type: *No Product type* /      Pharmacy:    [facility] [address], [address] [phone] [facility] [place] [phone]  [address] [facility]  [place] [facility] [address]  Phone: [phone] Fax: [phone]      Assistance purchasing medications?: Potential Assistance Purchasing Medications: No  Assistance provided by Case Management: None at this time    Does patient want to participate in local refill/ meds to beds program?: Not Assessed    Meds To Beds General Rules:  1. Can ONLY be done Monday- Friday between 8:30am-5pm  2. Prescription(s) must be in pharmacy by 3pm to be filled same day  3. Copy of patient's insurance/ prescription drug card and patient face sheet must be sent along with the prescription(s)  4. Cost of Rx cannot be added to hospital bill. If financial assistance is needed, please contact unit  or ;  or  CANNOT provide pharmacy voucher for patients co-pays  5.  Patients can then  the prescription on their way out of the hospital at discharge, or pharmacy can deliver to the bedside if staff is available. (payment due at time of pick-up or delivery - cash, check, or card accepted)     Able to afford home medications/ co-pay costs: Yes    ADLS:  Current PT AM-PAC Score: 24 /24  Current OT AM-PAC Score: 24 /24 DISCHARGE Disposition: Home- No Services Needed    LOC at discharge: Not Applicable  FABRICE Completed: Not Indicated    Notification completed in HENS/PAS?:  Not Applicable    IMM Completed:   Not Indicated    Transportation:  Transportation PLAN for discharge: family   Mode of Transport: Private Car    The Plan for Transition of Care is related to the following treatment goals of Pituitary macroadenoma ([facility] [address] [D35.2]  Pituitary adenoma ([facility] [address] [D35.2]    The Patient and/or patient representative Gifty Harman and his family were provided with a choice of provider and agrees with the discharge plan Not Indicated    Freedom of choice list was provided with basic dialogue that supports the patient's individualized plan of care/goals and shares the quality data associated with the providers.  Not Indicated    Care Transitions patient: No    Danisha Dawn RN  [facility]  Case Management Department  Ph: [phone]  Fax: [phone]

## 2021-01-29 NOTE — PROGRESS NOTES
NEUROSURGERY POST-OP PROGRESS NOTE    Patient Name: Beverley Cardona YOB: 1980   Sex: Male Age: 39 yrs     Medical Record Number: [id number] Acct Number: [de-identified]   Room Number: 5545/0578-10 Hospital Day: Hospital Day: 3     Interval History:  Post-operative Day# 2 s/p Procedure(s) (LRB):  ENDOSCOPIC ENDONASAL APPROACH FOR RESECTION OF PITUITARY TUMOR (N/A)  NEUROENDOSCOPY WITH REMOVAL OF PITUITARY TUMOR TRANSSPHENOIDAL, STEREOTACTIC SURGERY (N/A)    Subjective: Pt feeling better today, still some stuffiness    Objective:    VITAL SIGNS   BP (!) 139/90   Pulse 64   Temp 97.8 °F (36.6 °C) (Oral)   Resp 9   Ht 5' 11.5\" (1.816 m)   Wt 270 lb (122.5 kg)   SpO2 95%   BMI 37.13 kg/m²    Height Height: 5' 11.5\" (181.6 cm)   Weight Weight: 270 lb (122.5 kg)        Allergies No Known Allergies   NPO Status DIET GENERAL;   Isolation No active isolations     LABS   Basic Metabolic Profile Recent Labs     01/28/21  0508 01/28/21  0508 01/28/21 2058 01/29/21  0139 01/29/21  0608      < > 142 141 145  145     --   --   --  110   CO2 22  --   --   --  21   BUN 19  --   --   --  18   CREATININE 1.0  --   --   --  1.0   GLUCOSE 144*  --   --   --  108*    < > = values in this interval not displayed.       Complete Blood Count Recent Labs     01/28/21  0508 01/29/21  0608   WBC 11.7* 6.3   RBC 4.49 4.36      Coagulation Studies Recent Labs     01/28/21  0508   INR 1.19*        MEDICATIONS   Inpatient Medications     sodium chloride flush, 10 mL, Intravenous, 2 times per day    heparin (porcine), 5,000 Units, Subcutaneous, 3 times per day    sennosides-docusate sodium, 1 tablet, Oral, BID    polyethylene glycol, 17 g, Oral, Daily    pantoprazole, 40 mg, Oral, Daily    ampicillin-sulbactam, 1,500 mg, Intravenous, Q6H    oxymetazoline, 2 spray, Each Nostril, BID    sodium chloride, 1 spray, Each Nostril, Q4H   [COMPLETED] methocarbamol IVPB, 1,000 mg, Intravenous, Q8H **FOLLOWED BY** methocarbamol, 750 mg, Oral, 4 times per day    acetaminophen, 650 mg, Oral, Q6H   Infusions      Antibiotics   Recent Abx Admin                   ampicillin-sulbactam (UNASYN) 1500 mg IVPB minibag (mg) 1,500 mg New Bag 01/29/21 0549     1,500 mg New Bag  0030     1,500 mg New Bag 01/28/21 1749     1,500 mg New Bag  1206                 Neurologic Exam:  Mental status: awake and alert and oriented x4    Cranial Nerves:  II: Visual acuity not tested, visual fields full, denies new visual changes / diplopia  III, IV, VI: PERRL, 3 mm bilaterally, EOMI, no nystagmus noted  V: Facial sensation intact bilaterally to touch  VII: Face symmetric  VIII: Hearing intact bilaterally to spoken voice  IX: Palate movement equal bilaterally  XI: Shoulder shrug equal bilaterally  XII: Tongue midline      Musculoskeletal:   Gait: Not tested   Tone: normal  Sensory: intact to all extremities  Motor strength:    Right  Left    Right  Left    Deltoid  5 5  Hip Flex  5 5   Biceps  5 5  Knee Extensors  5 5   Triceps  5 5  Knee Flexors  5 5   Wrist Ext  5 5  Ankle Dorsiflex. 5 5   Wrist Flex  5 5  Ankle Plantarflex. 5 5   Handgrip  5 5  Ext Deven Longus  5 5   Thumb Ext  5 5           Respiratory:  Unlabored respiratory pattern    Abdomen:   Soft, ND     Cardiovascular:  Warm, well perfused    Assessment   Patient is a 38 yo M s/p Procedure(s) (LRB):  ENDOSCOPIC ENDONASAL APPROACH FOR RESECTION OF PITUITARY TUMOR (N/A)  NEUROENDOSCOPY WITH REMOVAL OF PITUITARY TUMOR TRANSSPHENOIDAL, STEREOTACTIC SURGERY (N/A) per Dr. David Beckham:  Patient improved significantly post-operatively. Eating, voiding and ambulating well.   Pain and nausea well controlled on oral medications  Surgical incision CDI without issues  Discharge home with family All discharge instructions including dressing changes and follow up care gone over with patient and family and provided in written form    Patient was seen with Dr. Mary Wyatt who agrees with above assessment and plan. Electronically signed by:  Saskia Maciel, 1/29/2021 9:58 AM   Neurosurgery Nurse Practitioner  [phone]

## 2021-01-29 NOTE — PROGRESS NOTES
Shift assessment completed. Pt ambulated from chair to bathroom and back to bed, tolerated well. A&Ox4 w/ no neuro changes. Pt reporting minimal headache pain at 2/10 and requests only scheduled Tylenol. Minimal bloody drainage on nasal dressing. Safety precautions in place. RN will monitor.

## 2021-01-29 NOTE — PROGRESS NOTES
This RN educated the pt on his discharge instructions, new medication instructions, and follow up appointment instructions. The pt was able to return demonstrate all of the above education provided by this RN and has no further questions at this time. The pt's after visit summary, medication scripts, and all of his personal belongings were transported with the pt to his wife's car at 1320.

## 2021-01-29 NOTE — PLAN OF CARE
Problem: Infection:  Goal: Will remain free from infection  Description: Will remain free from infection  1/29/2021 0939 by Dea Woodruff RN  Outcome: Ongoing  1/29/2021 0153 by Krunal Garcia RN  Outcome: Ongoing     Problem: Safety:  Goal: Free from accidental physical injury  Description: Free from accidental physical injury  1/29/2021 0939 by Dea Woodruff RN  Outcome: Ongoing  1/29/2021 0153 by Krunal Garcia RN  Outcome: Ongoing  Goal: Free from intentional harm  Description: Free from intentional harm  1/29/2021 0939 by Dea Woodruff RN  Outcome: Ongoing  1/29/2021 0153 by Krunal Garcia RN  Outcome: Ongoing     Problem: Daily Care:  Goal: Daily care needs are met  Description: Daily care needs are met  1/29/2021 0939 by Dea Woodruff RN  Outcome: Ongoing  1/29/2021 0153 by Krunal Garcia RN  Outcome: Ongoing     Problem: Pain:  Goal: Patient's pain/discomfort is manageable  Description: Patient's pain/discomfort is manageable  1/29/2021 0939 by Dea Woodruff RN  Outcome: Ongoing  1/29/2021 0153 by Krunal Garcia RN  Outcome: Ongoing  Goal: Pain level will decrease  Description: Pain level will decrease  1/29/2021 0939 by Dea Woodruff RN  Outcome: Ongoing  1/29/2021 0153 by Krunal Garcia RN  Outcome: Ongoing  Goal: Control of acute pain  Description: Control of acute pain  1/29/2021 0939 by Dea Woodruff RN  Outcome: Ongoing  1/29/2021 0153 by Krunal Garcia RN  Outcome: Ongoing  Goal: Control of chronic pain  Description: Control of chronic pain  1/29/2021 0939 by Dea Woodruff RN  Outcome: Ongoing  1/29/2021 0153 by Krunal Garcia RN  Outcome: Ongoing     Problem: Skin Integrity:  Goal: Skin integrity will stabilize  Description: Skin integrity will stabilize  1/29/2021 0939 by Dea Woodruff RN  Outcome: Ongoing  1/29/2021 0153 by Krunal Garcia RN  Outcome: Ongoing     Problem: Discharge Planning:  Goal: Patients continuum of care needs are met Description: Patients continuum of care needs are met  1/29/2021 0939 by Cande Hector RN  Outcome: Ongoing  1/29/2021 0153 by Lizett Apodaca RN  Outcome: Ongoing     Problem: Falls - Risk of:  Goal: Will remain free from falls  Description: Will remain free from falls  1/29/2021 0939 by Cande Hector RN  Outcome: Ongoing  1/29/2021 0153 by Lizett Apodaca RN  Outcome: Ongoing  Goal: Absence of physical injury  Description: Absence of physical injury  1/29/2021 0939 by Cande Hector RN  Outcome: Ongoing  1/29/2021 0153 by Lizett Apodaca RN  Outcome: Ongoing     Problem: Skin Integrity:  Goal: Will show no infection signs and symptoms  Description: Will show no infection signs and symptoms  1/29/2021 0939 by Cande Hector RN  Outcome: Ongoing  1/29/2021 0153 by Lizett Apodaca RN  Outcome: Ongoing  Goal: Absence of new skin breakdown  Description: Absence of new skin breakdown  1/29/2021 0939 by Cande Hector RN  Outcome: Ongoing  1/29/2021 0153 by Lizett Apodaca RN  Outcome: Ongoing

## 2021-01-29 NOTE — PLAN OF CARE
Problem: Infection:  Goal: Will remain free from infection  Description: Will remain free from infection  1/29/2021 0153 by Sara Cates RN  Outcome: Ongoing  Care plan reviewed with pt this shift. Problem: Pain:  Goal: Patient's pain/discomfort is manageable  Description: Patient's pain/discomfort is manageable  1/29/2021 0153 by Sara Cates RN  Outcome: Ongoing  Patient's pain is assessed every shift, post procedure, prn, or any changes in status. Pt's pain is assessed using 0-10 pain scale. Pt understands how to communicate pain to RN using this scale. Pt understands to notify RN with onset of new pain. Pt's pain has been controlled this shift. Pt is receiving Tylenol for pain.

## 2021-01-30 ENCOUNTER — CARE COORDINATION (OUTPATIENT)
Dept: CASE MANAGEMENT | Age: 41
End: 2021-01-30

## 2021-02-02 ENCOUNTER — CARE COORDINATION (OUTPATIENT)
Dept: CASE MANAGEMENT | Age: 41
End: 2021-02-02

## 2021-02-02 NOTE — CARE COORDINATION
Kristie [facility] Transitions Initial Follow Up Call    Call within 2 business days of discharge: Yes    Patient:  Minor Soda  Patient :  1980  MRN:  [id number]    Reason for Admission:  COVID 19   Discharge Date:  21   RARS: 9      CTC attempt to reach Pt regarding recent hospital discharge. CTC left voice recording with call back number requesting a call back. Follow up appointments:    Future Appointments   Date Time Provider Selene Richards   2021  9:50 AM Nikita Garcia MD [facility]   2021  8:50 AM MD Roxanne Lorenzo [facility]       Griselda Promise.  Randel Carrel, BSN, RN  Care Transition Coordinator  Contact Number:  [phone]

## 2021-02-04 ENCOUNTER — OFFICE VISIT (OUTPATIENT)
Dept: ENT CLINIC | Age: 41
End: 2021-02-04
Payer: COMMERCIAL

## 2021-02-04 VITALS — TEMPERATURE: 97.2 F

## 2021-02-04 DIAGNOSIS — D35.2 PITUITARY MACROADENOMA (HCC): ICD-10-CM

## 2021-02-04 DIAGNOSIS — R09.81 NASAL CONGESTION: Primary | ICD-10-CM

## 2021-02-04 DIAGNOSIS — Z48.89 POSTOPERATIVE VISIT: ICD-10-CM

## 2021-02-04 PROCEDURE — 31237 NSL/SINS NDSC SURG BX POLYPC: CPT | Performed by: OTOLARYNGOLOGY

## 2021-02-04 NOTE — PROGRESS NOTES
S/P endoscopic pituitary. No rhinorrhea or bleeding. Splints were removed and nasal cavity suctioned and sphenoid sinus debrided. Healing well. Saline rinses twice a day. Saline sprays 5-6 times a day. F/U in 3 weeks. Nasal Endoscopy with Debridement  Pre Op Dx: Nasal congestion, post operative pituitarysurgery  Post Op Dx: Same  Procedure: Nasal endoscopy with debridement bilateral  Anesthesia: 2% lidocaine with afrin tiopical  EBL: None  Specimens: None  Findings: Bilateral nasal cavity and sinus crusting and mucus. Procedure:    After the application of afrin and pontocaine the nasal sinus cavity was debrided utilizing a zero degree jen endoscope with Kerrison rongeurs and ozuna suctions on both sides. The sinus lining was healing well. No evidence of bleeding or rhinorrhea was noted. Tolerated well.     Complications: None

## 2021-02-05 ENCOUNTER — HOSPITAL ENCOUNTER (OUTPATIENT)
Age: 41
Discharge: HOME | End: 2021-02-05
Payer: COMMERCIAL

## 2021-02-05 LAB
PROLACTIN: 7.2 NG/ML
T3 FREE: 3.1 PG/ML (ref 2.3–4.2)
T4 FREE: 1 NG/DL (ref 0.9–1.8)
TSH REFLEX: 2.04 UIU/ML (ref 0.27–4.2)

## 2021-02-05 PROCEDURE — 84443 ASSAY THYROID STIM HORMONE: CPT

## 2021-02-05 PROCEDURE — 84481 FREE ASSAY (FT-3): CPT

## 2021-02-05 PROCEDURE — 84146 ASSAY OF PROLACTIN: CPT

## 2021-02-05 PROCEDURE — 84439 ASSAY OF FREE THYROXINE: CPT

## 2021-02-05 PROCEDURE — 36415 COLL VENOUS BLD VENIPUNCTURE: CPT

## 2021-02-08 NOTE — OP NOTE
PROCEDURE IN DETAIL: The patient was brought to the operating room, general anesthesia was induced and the patient was intubated. The patient was placed in the supine position and the head was placed in a [place] horseshoe head rest. Stealth stereotacic navigation was registered successfully. A time out was completed, the site was prepped and draped in the usual sterile fashion. Intravenous antibiotics were given by anesthesia. The arterial pCO2 was maintained at 28. The approach was performed by Dr. Rona Escamilla, he will dictated a separate detailed operative report regarding his part of the surgical procedure. Briefly, the middle turbinates were lateralized and a sphenoidotomy created by opeing the sphenoid ostium bilaterally and partially resecting the rostrum. This provided a surgical corridor to access the sphenoid sinus. I entered the operation at this time. The septum of the sphenoid sinus was drilled away, and the face of the sella was thinned with the high speed drill. A Avtar dissector was used to open the thin bone of the sellar face and a #1 punch then used to create a wide sellar opening. The dura was opened with a sickle knife. The tumor was immediately appreciated. The tumor wall was progressively dissected free of the surrounding tissue and normal pituitary gland. The tumor was opened and endoscopic curettes were used to internally debulk the mass, further  it from the normal gland, diaphragma and medial cavernous sinus walls. The bulk of the capsule was then cut free and removed. Flowseal was injected followed by a cottonoid elle to obtain hemostasis from the sinus.  The floor of the sella was then curetted to ensure complete removal. The wound was then irrigated copiously to inspect for any residual bleeding, which was stopped with Flowseal. Once hemostasis was deemed satisfactory. The operation was turned back to Dr. Madi Fischer for closure of the sellar face and packing of the nose. This will also be dictated separately in detail. The patient was awakened from anesthesia and extubated. His neurologic exam was stable post-operatively and he was transferred to the PACU. All needle, instrument, and sponge counts were correct. I attest that I was present throughout the entire operation in accordance with CMS guidelines. ESTIMATED BLOOD LOSS: 50 cubic centimeters. FLUIDS: Per Anesthesia. SPECIMENS: All specimens sent to pathology, frozen section consistent pituitary adenoma    COMPLICATIONS: None apparent. DRAINS PLACED: None    DISPOSITION: The patient was brought to the PACU awake, following commands, and moving all 4 extremities.

## 2021-02-19 ENCOUNTER — VIRTUAL VISIT (OUTPATIENT)
Dept: ENDOCRINOLOGY | Age: 41
End: 2021-02-19
Payer: COMMERCIAL

## 2021-02-19 DIAGNOSIS — D49.7 PITUITARY TUMOR: Primary | ICD-10-CM

## 2021-02-19 PROCEDURE — 99213 OFFICE O/P EST LOW 20 MIN: CPT | Performed by: INTERNAL MEDICINE

## 2021-02-19 NOTE — PROGRESS NOTES
Subjective:        Pursuant to the emergency declaration under the [facility], [facility] waiver authority and the [employer] Act, this Virtual  Visit was conducted, with patient's consent, to reduce the patient's risk of exposure to COVID-19 and provide continuity of care for an established patient. Patient was at home. Provider was at home. No one else was involved  Services were provided through a video synchronous discussion virtually to substitute for in-person clinic visit. 38 y/o WM seen for pituitary adenoma. Interim:    He had TS surgery for pituitary tumour resection  Due to increase in size 1.7 cm    1/21 MRI  Interval postoperative changes of transsphenoidal surgery. Previously noted heterogeneously enhancing isn't along the superior aspect of the pituitary gland has been resected. Residual enhancing tissue is favored to represent normal pituitary tissue. He had headache and MRA done showed an adenoma. Headache started 3/19, lasted for a week, now resolved  No vision changes    States visual field exam normal at [facility]  No breast discharge  No ED, low libido  No change in hand size, shoe size    He saw neurosurgery, was recommend 6 month f/u MRI    3/19  CONCLUSION:   1. 1.3 cm inhomogeneous sellar mass deviating the pituitary stalk to the left impinging on    infundibulum most consistent with macro adenoma.  Suspected extension into the cavernous    carotid region on the right, possibly on the left. 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  This mass deviates the infundibulum to the    left and mildly effaces the optic chiasm.  No evidence of cavernous sinus invasion. Moderate, no worsening factors.     2/20 MRI  Pituitary macro adenoma measuring 1.51cm by 1.11cm.  This mass deviates the infundibulum to the HENT: Negative for hearing loss, ear pain, nosebleeds, neck pain and tinnitus. Eyes: Negative for blurred vision. Negative for double vision, photophobia and pain. Respiratory: Negative for cough and sputum production. Cardiovascular: Negative for chest pain, palpitations and leg swelling. Gastrointestinal: Negative for nausea, vomiting and abdominal pain. Genitourinary: Negative for dysuria, urgency and frequency. Musculoskeletal: Negative for back pain. No joint pain  Skin: Negative for itching and rash. Neurological: Negative for dizziness. Negative for tingling, tremors, focal weakness and headaches. Endo/Heme/Allergies: see HPI  Psychiatric/Behavioral: Negative for depression and substance abuse. PHYSICAL EXAMINATION:  [ INSTRUCTIONS:  \"[x]\" Indicates a positive item  \"[]\" Indicates a negative item  -- DELETE ALL ITEMS NOT EXAMINED]  Vital Signs: (As obtained by patient/caregiver or practitioner observation)    Blood pressure-  Heart rate-    Respiratory rate-    Temperature-  Pulse oximetry-     Constitutional Appears well-developed and well-nourished No apparent distress        Mental status  Alert and awake  Oriented to person/place/time  Able to follow commands      Eyes:  EOM    [x]  Normal    Sclera  [x]  Normal           Discharge [x]  None visible      HENT:   [x] Normocephalic, atraumatic.     [x] Mouth/Throat: Mucous membranes are moist.     External Ears [x] Normal  no discharge    Neck: [x] No visualized mass  no swelling    Pulmonary/Chest: [x] Respiratory effort normal.  [x] No visualized signs of difficulty breathing or respiratory distress             Musculoskeletal:   [x] Normal gait with no signs of ataxia         [x] Normal range of motion of neck          Head and neck stable, appears normal ROM, strength good    Neurological:        [x] No Facial Asymmetry (Cranial nerve 7 motor function) (limited exam to video visit)          [x] No gaze palsy Skin:        [x] No significant exanthematous lesions or discoloration noted on facial skin                 Psychiatric:       [x] Normal Affect [x] No Hallucinations            Other pertinent observable physical exam findings-     Due to this being a TeleHealth encounter, evaluation of the following organ systems is limited: Vitals/Constitutional/EENT/Resp/CV/GI//MS/Neuro/Skin/Heme-Lymph-Imm. Services were provided through a video synchronous discussion virtually to substitute for in-person clinic visit. Lab Review  Lab Results   Component Value Date    TSH 2.22 01/20/2021     No results found for: FREET4      Assessment:     Pituitary Adenoma: Macroadenoma 1.5cm, incidental finding, non functional,nl 24 hour urine cortisol and free thyroid level. VF is normal. Testosterone is low , not symptomatic, will hold on replacement . [address] low but IGF-1 is normal. 6 month f/u stable. Discussed symptoms of apoplexy . Size increased to 1.7cm, had resection. Cortisol, TFT nl. No need for hormone replacement  Check hormones next year. No symptoms of low testosterone    Plan:     1. TFT , cortisol in a year  2. Testosterone

## 2021-03-04 ENCOUNTER — OFFICE VISIT (OUTPATIENT)
Dept: ENT CLINIC | Age: 41
End: 2021-03-04
Payer: COMMERCIAL

## 2021-03-04 VITALS — WEIGHT: 279 LBS | BODY MASS INDEX: 39.06 KG/M2 | TEMPERATURE: 97.7 F | HEIGHT: 71 IN

## 2021-03-04 DIAGNOSIS — R09.81 NASAL CONGESTION: ICD-10-CM

## 2021-03-04 DIAGNOSIS — Z48.89 POSTOPERATIVE VISIT: Primary | ICD-10-CM

## 2021-03-04 PROCEDURE — 99024 POSTOP FOLLOW-UP VISIT: CPT | Performed by: OTOLARYNGOLOGY

## 2021-03-04 PROCEDURE — 31237 NSL/SINS NDSC SURG BX POLYPC: CPT | Performed by: OTOLARYNGOLOGY

## 2021-03-04 NOTE — PROGRESS NOTES
Status post endoscopic pituitary surgery. Bilateral nasal cavity crusting including the sphenoid sinus identified with nasal obstruction. The nasal cavity was debrided and the sella is healing nicely. No evidence of CSF rhinorrhea. Continue with saline nasal sprays and irrigations. Follow up as needed. Nasal Endoscopy with Debridement  Pre Op Dx: Nasal congestion, post operative pituitary surgery  Post Op Dx: Same  Procedure: Nasal endoscopy with debridement bilateral  Anesthesia: 2% lidocaine with afrin tiopical  EBL: None  Specimens: None  Findings: both sides nasal cavity and sinus crusting and mucus. Procedure:    After the application of afrin and pontocaine the nasal sinus cavity was debrided utilizing a zero degree jen endoscope with Kerrison rongeurs and ozuna suctions on both sides. The sinus lining was healing well. No evidence of bleeding or rhinorrhea was noted. Tolerated well.     Complications: None

## 2021-05-13 DIAGNOSIS — K21.9 HIATAL HERNIA WITH GERD: ICD-10-CM

## 2021-05-13 DIAGNOSIS — K44.9 HIATAL HERNIA WITH GERD: ICD-10-CM

## 2021-05-13 RX ORDER — PANTOPRAZOLE SODIUM 40 MG/1
TABLET, DELAYED RELEASE ORAL
Qty: 90 TABLET | Refills: 3 | Status: SHIPPED | OUTPATIENT
Start: 2021-05-13 | End: 2022-03-01 | Stop reason: SDUPTHER

## 2021-10-22 ENCOUNTER — OFFICE VISIT (OUTPATIENT)
Dept: INTERNAL MEDICINE CLINIC | Age: 41
End: 2021-10-22
Payer: COMMERCIAL

## 2021-10-22 VITALS
TEMPERATURE: 97.7 F | SYSTOLIC BLOOD PRESSURE: 126 MMHG | DIASTOLIC BLOOD PRESSURE: 70 MMHG | HEART RATE: 80 BPM | OXYGEN SATURATION: 94 % | WEIGHT: 278 LBS | BODY MASS INDEX: 38.77 KG/M2

## 2021-10-22 DIAGNOSIS — Z23 NEED FOR INFLUENZA VACCINATION: ICD-10-CM

## 2021-10-22 DIAGNOSIS — Z98.890 S/P SELECTIVE TRANSSPHENOIDAL PITUITARY ADENOMECTOMY: ICD-10-CM

## 2021-10-22 DIAGNOSIS — D35.2 PITUITARY MACROADENOMA (HCC): ICD-10-CM

## 2021-10-22 DIAGNOSIS — Z00.00 ANNUAL PHYSICAL EXAM: ICD-10-CM

## 2021-10-22 DIAGNOSIS — E66.09 CLASS 2 OBESITY DUE TO EXCESS CALORIES WITHOUT SERIOUS COMORBIDITY WITH BODY MASS INDEX (BMI) OF 38.0 TO 38.9 IN ADULT: ICD-10-CM

## 2021-10-22 DIAGNOSIS — Z86.010 HISTORY OF COLON POLYPS: ICD-10-CM

## 2021-10-22 DIAGNOSIS — K21.9 GASTROESOPHAGEAL REFLUX DISEASE WITHOUT ESOPHAGITIS: ICD-10-CM

## 2021-10-22 DIAGNOSIS — Z00.00 ANNUAL PHYSICAL EXAM: Primary | ICD-10-CM

## 2021-10-22 DIAGNOSIS — E78.1 PURE HYPERTRIGLYCERIDEMIA: ICD-10-CM

## 2021-10-22 DIAGNOSIS — Z86.018 S/P SELECTIVE TRANSSPHENOIDAL PITUITARY ADENOMECTOMY: ICD-10-CM

## 2021-10-22 PROBLEM — E66.812 CLASS 2 OBESITY DUE TO EXCESS CALORIES WITHOUT SERIOUS COMORBIDITY WITH BODY MASS INDEX (BMI) OF 38.0 TO 38.9 IN ADULT: Status: ACTIVE | Noted: 2021-10-22

## 2021-10-22 PROBLEM — Z86.0100 HISTORY OF COLON POLYPS: Status: ACTIVE | Noted: 2021-10-22

## 2021-10-22 LAB
A/G RATIO: 2.1 (ref 1.1–2.2)
ALBUMIN SERPL-MCNC: 5 G/DL (ref 3.4–5)
ALP BLD-CCNC: 102 U/L (ref 40–129)
ALT SERPL-CCNC: 67 U/L (ref 10–40)
ANION GAP SERPL CALCULATED.3IONS-SCNC: 10 MMOL/L (ref 3–16)
AST SERPL-CCNC: 44 U/L (ref 15–37)
BASOPHILS ABSOLUTE: 0.1 K/UL (ref 0–0.2)
BASOPHILS RELATIVE PERCENT: 0.8 %
BILIRUB SERPL-MCNC: 0.5 MG/DL (ref 0–1)
BUN BLDV-MCNC: 20 MG/DL (ref 7–20)
CALCIUM SERPL-MCNC: 9.6 MG/DL (ref 8.3–10.6)
CHLORIDE BLD-SCNC: 110 MMOL/L (ref 99–110)
CHOLESTEROL, TOTAL: 194 MG/DL (ref 0–199)
CO2: 26 MMOL/L (ref 21–32)
CREAT SERPL-MCNC: 1.1 MG/DL (ref 0.9–1.3)
EOSINOPHILS ABSOLUTE: 0.1 K/UL (ref 0–0.6)
EOSINOPHILS RELATIVE PERCENT: 2.1 %
GFR AFRICAN AMERICAN: >60
GFR NON-AFRICAN AMERICAN: >60
GLOBULIN: 2.4 G/DL
GLUCOSE BLD-MCNC: 134 MG/DL (ref 70–99)
HCT VFR BLD CALC: 45.1 % (ref 40.5–52.5)
HDLC SERPL-MCNC: 35 MG/DL (ref 40–60)
HEMOGLOBIN: 14.9 G/DL (ref 13.5–17.5)
HEPATITIS C ANTIBODY INTERPRETATION: NORMAL
LDL CHOLESTEROL CALCULATED: ABNORMAL MG/DL
LDL CHOLESTEROL DIRECT: 109 MG/DL
LYMPHOCYTES ABSOLUTE: 1.6 K/UL (ref 1–5.1)
LYMPHOCYTES RELATIVE PERCENT: 24.7 %
MCH RBC QN AUTO: 28.4 PG (ref 26–34)
MCHC RBC AUTO-ENTMCNC: 33.1 G/DL (ref 31–36)
MCV RBC AUTO: 85.9 FL (ref 80–100)
MONOCYTES ABSOLUTE: 0.5 K/UL (ref 0–1.3)
MONOCYTES RELATIVE PERCENT: 7 %
NEUTROPHILS ABSOLUTE: 4.2 K/UL (ref 1.7–7.7)
NEUTROPHILS RELATIVE PERCENT: 65.4 %
PDW BLD-RTO: 13.9 % (ref 12.4–15.4)
PLATELET # BLD: 276 K/UL (ref 135–450)
PMV BLD AUTO: 8.9 FL (ref 5–10.5)
POTASSIUM SERPL-SCNC: 4.4 MMOL/L (ref 3.5–5.1)
RBC # BLD: 5.25 M/UL (ref 4.2–5.9)
SODIUM BLD-SCNC: 146 MMOL/L (ref 136–145)
TOTAL PROTEIN: 7.4 G/DL (ref 6.4–8.2)
TRIGL SERPL-MCNC: 317 MG/DL (ref 0–150)
VLDLC SERPL CALC-MCNC: ABNORMAL MG/DL
WBC # BLD: 6.5 K/UL (ref 4–11)

## 2021-10-22 PROCEDURE — 90471 IMMUNIZATION ADMIN: CPT | Performed by: NURSE PRACTITIONER

## 2021-10-22 PROCEDURE — 99396 PREV VISIT EST AGE 40-64: CPT | Performed by: NURSE PRACTITIONER

## 2021-10-22 PROCEDURE — 90674 CCIIV4 VAC NO PRSV 0.5 ML IM: CPT | Performed by: NURSE PRACTITIONER

## 2021-10-22 ASSESSMENT — ENCOUNTER SYMPTOMS
GASTROINTESTINAL NEGATIVE: 1
RESPIRATORY NEGATIVE: 1

## 2021-10-22 NOTE — PROGRESS NOTES
SUBJECTIVE:    Patient ID: Gabbi Pina is a 39 y.o. male. CC: Annual physical, pituitary macroadenoma status post surgery, dyslipidemia, GERD    HPI: Patient presents to the office today for annual physical examination and follow-up of chronic medical conditions. Patient has a history of pituitary macroadenoma. On 1/27/2021, he underwent endoscopic resection of pituitary tumor. He reports this was completed without complication. He has a history of dyslipidemia. He denies any chest pain or shortness of breath. He has a history of GERD. He is compliant with PPI. Patient has a history of sleep apnea. He has had colon polyps when he was younger. This resulted in early colonoscopy. He was due for recheck. Past Medical History:   Diagnosis Date    Allergic rhinitis     Dizziness     GERD (gastroesophageal reflux disease)     Headache     Hyperglycemia     JURGEN on CPAP     Recurrent upper respiratory infection (URI)     Sleep apnea     Tinnitus         Current Outpatient Medications   Medication Sig Dispense Refill    pantoprazole (PROTONIX) 40 MG tablet TAKE ONE TABLET BY MOUTH DAILY 90 tablet 3    Multiple Vitamins-Minerals (MULTIVITAMIN ADULT PO) Take by mouth      Cholecalciferol (VITAMIN D3) 125 MCG (5000 UT) TABS Take by mouth      Loratadine (CLARITIN PO) Take by mouth nightly        No current facility-administered medications for this visit. Review of Systems   Constitutional: Negative. Respiratory: Negative. Cardiovascular: Negative. Gastrointestinal: Negative. Genitourinary: Negative. Musculoskeletal: Negative. Neurological: Negative. Psychiatric/Behavioral: Negative. All other systems reviewed and are negative. OBJECTIVE:  Physical Exam  Vitals reviewed. Constitutional:       General: He is not in acute distress. Appearance: He is well-developed. He is not diaphoretic. HENT:      Head: Normocephalic and atraumatic. Eyes:      General: No scleral icterus. Conjunctiva/sclera: Conjunctivae normal.   Neck:      Vascular: No JVD. Cardiovascular:      Rate and Rhythm: Normal rate and regular rhythm. Pulmonary:      Effort: Pulmonary effort is normal. No respiratory distress. Breath sounds: Normal breath sounds. No wheezing or rales. Abdominal:      General: There is no distension. Palpations: Abdomen is soft. Tenderness: There is no abdominal tenderness. There is no guarding or rebound. Musculoskeletal:         General: Normal range of motion. Cervical back: Neck supple. Skin:     General: Skin is warm and dry. Neurological:      Mental Status: He is alert and oriented to person, place, and time. Psychiatric:         Behavior: Behavior normal.         Thought Content: Thought content normal.        /70 (Cuff Size: Large Adult)   Pulse 80   Temp 97.7 °F (36.5 °C)   Wt 278 lb (126.1 kg)   SpO2 94%   BMI 38.77 kg/m²      PHQ Scores 1/19/2021 3/18/2019 9/17/2018 1/6/2017   PHQ2 Score 0 0 0 0   PHQ9 Score 0 0 0 0     Interpretation of Total Score Depression Severity: 1-4 = Minimal depression, 5-9 = Mild depression, 10-14 = Moderate depression, 15-19 = Moderately severe depression, 20-27 =Severe depression        ASSESSMENT/PLAN:  Vivien Soliz was seen today for annual exam.    Diagnoses and all orders for this visit:    Annual physical exam  -     COMPREHENSIVE METABOLIC PANEL; Future  -     LIPID PANEL; Future  -     Hemoglobin A1C; Future  -     Hepatitis C Antibody; Future  -     HIV Screen; Future  -     CBC WITH AUTO DIFFERENTIAL;  Future    Pituitary macroadenoma (HCC)  S/P selective transsphenoidal pituitary adenomectomy  -Surgery performed without complication  -Follow-up thyroid testing was normal  -Keep follow-up with surgeon as directed    Pure hypertriglyceridemia  -Weight loss encouraged  -Monitor cholesterol    Gastroesophageal reflux disease without esophagitis  -Chronic, stable  -Continue current regimen    Class 2 obesity due to excess calories with serious comorbidity with body mass index (BMI) of 38.0 to 38.9 in adult  -We discussed options for weight loss. -He will focus on healthy diet and exercise.     History of colon polyps  -     COLONOSCOPY (Screening)    Need for influenza vaccination  -     INFLUENZA, MDCK QUADV, 2 YRS AND OLDER, IM, PF, PREFILL SYR OR SDV, 0.5ML (Angus Landers, PF)        OLYA Melendrez - CNP

## 2021-10-23 LAB
ESTIMATED AVERAGE GLUCOSE: 122.6 MG/DL
HBA1C MFR BLD: 5.9 %
HIV AG/AB: NORMAL
HIV ANTIGEN: NORMAL
HIV-1 ANTIBODY: NORMAL
HIV-2 AB: NORMAL

## 2022-02-17 ENCOUNTER — TELEMEDICINE (OUTPATIENT)
Dept: INTERNAL MEDICINE CLINIC | Age: 42
End: 2022-02-17
Payer: COMMERCIAL

## 2022-02-17 DIAGNOSIS — R50.9 FEBRILE ILLNESS: Primary | ICD-10-CM

## 2022-02-17 LAB
INFLUENZA A ANTIBODY: POSITIVE
INFLUENZA B ANTIBODY: NORMAL

## 2022-02-17 PROCEDURE — 99213 OFFICE O/P EST LOW 20 MIN: CPT | Performed by: NURSE PRACTITIONER

## 2022-02-17 RX ORDER — AMOXICILLIN AND CLAVULANATE POTASSIUM 875; 125 MG/1; MG/1
1 TABLET, FILM COATED ORAL 2 TIMES DAILY
Qty: 20 TABLET | Refills: 0 | Status: SHIPPED | OUTPATIENT
Start: 2022-02-17 | End: 2022-02-27

## 2022-02-17 RX ORDER — OSELTAMIVIR PHOSPHATE 75 MG/1
75 CAPSULE ORAL 2 TIMES DAILY
Qty: 10 CAPSULE | Refills: 0 | Status: SHIPPED | OUTPATIENT
Start: 2022-02-17 | End: 2022-02-22

## 2022-02-17 ASSESSMENT — PATIENT HEALTH QUESTIONNAIRE - PHQ9
2. FEELING DOWN, DEPRESSED OR HOPELESS: 0
SUM OF ALL RESPONSES TO PHQ QUESTIONS 1-9: 0
SUM OF ALL RESPONSES TO PHQ9 QUESTIONS 1 & 2: 0
SUM OF ALL RESPONSES TO PHQ QUESTIONS 1-9: 0
1. LITTLE INTEREST OR PLEASURE IN DOING THINGS: 0

## 2022-02-17 ASSESSMENT — ENCOUNTER SYMPTOMS: COUGH: 1

## 2022-02-17 NOTE — PROGRESS NOTES
2022    TELEHEALTH EVALUATION -- Audio/Visual (During QJVEG-31 public health emergency)    HPI:    Sol Posadas (:  1980) has requested an audio/video evaluation for the following concern(s):      Respiratory symptoms started two days ago. Sinus congestion, drainage, cough. Coughing up green phlegm. No blood. Feels terrible, aches, fatigue, HA    COVID test Tuesday evening neg. 103 fever last night. 101.8 today  Took Tylenol, mucinex. Wife and daughter had sinuses without fever. They are better. He has history of atypical pneumonia which is wife concern. Review of Systems   Constitutional: Positive for fatigue and fever. Respiratory: Positive for cough. Prior to Visit Medications    Medication Sig Taking? Authorizing Provider   pantoprazole (PROTONIX) 40 MG tablet TAKE ONE TABLET BY MOUTH DAILY  Clifford Bangura APRN - CNP   Multiple Vitamins-Minerals (MULTIVITAMIN ADULT PO) Take by mouth  Historical Provider, MD   Cholecalciferol (VITAMIN D3) 125 MCG (5000 UT) TABS Take by mouth  Historical Provider, MD   Loratadine (CLARITIN PO) Take by mouth nightly   Historical Provider, MD       Social History     Tobacco Use    Smoking status: Never Smoker    Smokeless tobacco: Never Used   Vaping Use    Vaping Use: Never used   Substance Use Topics    Alcohol use:  Yes     Alcohol/week: 1.0 standard drink     Types: 1 Shots of liquor per week     Comment: ONCE A MONTH    Drug use: Never            PHYSICAL EXAMINATION:  [ INSTRUCTIONS:  \"[x]\" Indicates a positive item  \"[]\" Indicates a negative item  -- DELETE ALL ITEMS NOT EXAMINED]  Vital Signs: (As obtained by patient/caregiver or practitioner observation)    Blood pressure-  Heart rate-    Respiratory rate-    Temperature-  Pulse oximetry-     Constitutional: [x] Appears well-developed and well-nourished [x] No apparent distress      [x] Abnormal- appears ill but non-toxic    Mental status  [x] Alert and awake  [x] Oriented to person/place/time [x]Able to follow commands      Eyes:  EOM    [x]  Normal  [] Abnormal-  Sclera  [x]  Normal  [] Abnormal -         Discharge [x]  None visible  [] Abnormal -    HENT:   [x] Normocephalic, atraumatic. [] Abnormal   [x] Mouth/Throat: Mucous membranes are moist.     External Ears [x] Normal  [] Abnormal-     Neck: [x] No visualized mass     Pulmonary/Chest: [x] Respiratory effort normal.  [x] No visualized signs of difficulty breathing or respiratory distress        [] Abnormal-      Musculoskeletal:   [x] Normal gait with no signs of ataxia         [x] Normal range of motion of neck        [] Abnormal-       Neurological:        [x] No Facial Asymmetry (Cranial nerve 7 motor function) (limited exam to video visit)          [x] No gaze palsy        [] Abnormal-         Skin:        [x] No significant exanthematous lesions or discoloration noted on facial skin         [] Abnormal-            Psychiatric:       [x] Normal Affect [x] No Hallucinations        [] Abnormal-     Other pertinent observable physical exam findings-         ASSESSMENT/PLAN:  1. Febrile illness  - 2-3 days of illness  - Looks sick but non-toxic.  - COVID test neg at home. Tested on day 1 so recommended retest which he will do  - Suspicious for flu given symptoms, fever.   - Strep seems less likely given mild sore throat  - History of atypical pneumonia with high fever, etc.      - COVID test at home  - Flu & strep testing  - CXR given fever & history. Will start prophylactic abx pending result    - XR CHEST STANDARD (2 VW)  - amoxicillin-clavulanate (AUGMENTIN) 875-125 MG per tablet; Take 1 tablet by mouth 2 times daily for 10 days  Dispense: 20 tablet; Refill: 0    Addendum:  COVID #2 neg  Flu A +  Strep not available    Rx: Tamiflu 75 mg BID x5 days. Hold Augmentin for now. OK to hold off on CXR pending improvement    No follow-ups on file.     Bartolo Cao, was evaluated through a synchronous (real-time) audio-video encounter. The patient (or guardian if applicable) is aware that this is a billable service, which includes applicable co-pays. This Virtual Visit was conducted with patient's (and/or legal guardian's) consent. The visit was conducted pursuant to the emergency declaration under the [address] [place] [address] authority and the [employer] and [employer] General Act. Patient identification was verified, and a caregiver was present when appropriate. The patient was located at home in a state where the provider was licensed to provide care. Total time spent on this encounter: Not billed by time    --OLYA Rizvi CNP on 2/17/2022 at 9:29 AM    An electronic signature was used to authenticate this note.

## 2022-03-01 ENCOUNTER — TELEMEDICINE (OUTPATIENT)
Dept: INTERNAL MEDICINE CLINIC | Age: 42
End: 2022-03-01
Payer: COMMERCIAL

## 2022-03-01 DIAGNOSIS — F41.9 ANXIETY: ICD-10-CM

## 2022-03-01 DIAGNOSIS — K21.9 HIATAL HERNIA WITH GERD: ICD-10-CM

## 2022-03-01 DIAGNOSIS — K44.9 HIATAL HERNIA WITH GERD: ICD-10-CM

## 2022-03-01 DIAGNOSIS — F90.9 ADULT ADHD: Primary | ICD-10-CM

## 2022-03-01 DIAGNOSIS — F33.0 MILD EPISODE OF RECURRENT MAJOR DEPRESSIVE DISORDER (HCC): ICD-10-CM

## 2022-03-01 DIAGNOSIS — R41.840 INATTENTION: ICD-10-CM

## 2022-03-01 PROCEDURE — 99213 OFFICE O/P EST LOW 20 MIN: CPT | Performed by: NURSE PRACTITIONER

## 2022-03-01 RX ORDER — PANTOPRAZOLE SODIUM 40 MG/1
40 TABLET, DELAYED RELEASE ORAL DAILY
Qty: 90 TABLET | Refills: 3 | Status: SHIPPED | OUTPATIENT
Start: 2022-03-01

## 2022-03-01 NOTE — PROGRESS NOTES
3/1/2022    TELEHEALTH EVALUATION -- Audio/Visual ([facility])    HPI:    Oseas Roberts (:  1980) has requested an audio/video evaluation for the following concern(s):    Concern about possible ADHD. He reports both of his daughters have been diagnosed with ADHD. He has noticed symptoms concerning for himself. He reports symptoms including difficulty with attention, focusing, impulsivity, disorganization, poor time management skills, trouble multitasking, low frustration tolerance, and he feels like his he is easily distracted. He feels this is a problem he has had for a long time but it seems to be getting worse over the past few days. He also admits to anxiety and poor mood. Review of Systems   Psychiatric/Behavioral: Positive for decreased concentration and dysphoric mood. Negative for behavioral problems and confusion. The patient is nervous/anxious. All other systems reviewed and are negative. Prior to Visit Medications    Medication Sig Taking? Authorizing Provider   pantoprazole (PROTONIX) 40 MG tablet Take 1 tablet by mouth daily Yes OLYA Ravi - CNP   Multiple Vitamins-Minerals (MULTIVITAMIN ADULT PO) Take by mouth Yes Historical Provider, MD   Cholecalciferol (VITAMIN D3) 125 MCG (5000 UT) TABS Take by mouth Yes Historical Provider, MD   Loratadine (CLARITIN PO) Take by mouth nightly  Yes Historical Provider, MD       Social History     Tobacco Use    Smoking status: Never Smoker    Smokeless tobacco: Never Used   Vaping Use    Vaping Use: Never used   Substance Use Topics    Alcohol use:  Yes     Alcohol/week: 1.0 standard drink     Types: 1 Shots of liquor per week     Comment: ONCE A MONTH    Drug use: Never            PHYSICAL EXAMINATION:  [ INSTRUCTIONS:  \"[x]\" Indicates a positive item  \"[]\" Indicates a negative item  -- DELETE ALL ITEMS NOT EXAMINED]  Vital Signs: (As obtained by patient/caregiver or practitioner observation)    Blood pressure-  Heart rate-    Respiratory rate-    Temperature-  Pulse oximetry-     Constitutional: [x] Appears well-developed and well-nourished [x] No apparent distress      [] Abnormal-   Mental status  [x] Alert and awake  [x] Oriented to person/place/time [x]Able to follow commands      Eyes:  EOM    [x]  Normal  [] Abnormal-  Sclera  [x]  Normal  [] Abnormal -         Discharge [x]  None visible  [] Abnormal -    HENT:   [x] Normocephalic, atraumatic. [] Abnormal   [x] Mouth/Throat: Mucous membranes are moist.     External Ears [x] Normal  [] Abnormal-     Neck: [x] No visualized mass     Pulmonary/Chest: [x] Respiratory effort normal.  [x] No visualized signs of difficulty breathing or respiratory distress        [] Abnormal-      Musculoskeletal:   [x] Normal gait with no signs of ataxia         [x] Normal range of motion of neck        [] Abnormal-       Neurological:        [x] No Facial Asymmetry (Cranial nerve 7 motor function) (limited exam to video visit)          [x] No gaze palsy        [] Abnormal-         Skin:        [x] No significant exanthematous lesions or discoloration noted on facial skin         [] Abnormal-            Psychiatric:       [x] Normal Affect [x] No Hallucinations        [] Abnormal-     Other pertinent observable physical exam findings-         ASSESSMENT/PLAN:  1. Adult ADHD  -Concerned about diagnosis of adult ADHD  -There also seems to be an anxiety or depression component  -Discussed options and I have asked him to get an assessment from our psychologist.  He is amenable. - Ambulatory referral to Psychology    2. Inattention  -As above  - Ambulatory referral to Psychology    3. Anxiety  -As above  - Ambulatory referral to Psychology    4. Mild episode of recurrent major depressive disorder ([facility] [address]  -As above  - Ambulatory referral to Psychology    5. Hiatal hernia with GERD  -Chronic, stable  - pantoprazole (PROTONIX) 40 MG tablet;  Take 1 tablet by mouth daily  Dispense: 90 tablet; Refill: 3      No follow-ups on file. Tammie Schmidt, was evaluated through a synchronous (real-time) audio-video encounter. The patient (or guardian if applicable) is aware that this is a billable service, which includes applicable co-pays. This Virtual Visit was conducted with patient's (and/or legal guardian's) consent. The visit was conducted pursuant to the emergency declaration under the [address] [place] [address], [address] [place] [address] authority and the [employer] and [employer] General Act. Patient identification was verified, and a caregiver was present when appropriate. The patient was located at home in a state where the provider was licensed to provide care. Total time spent on this encounter: Not billed by time    --OLYA Rizvi CNP on 3/17/2022 at 6:21 AM    An electronic signature was used to authenticate this note.

## 2022-03-02 ENCOUNTER — TELEPHONE (OUTPATIENT)
Dept: INTERNAL MEDICINE CLINIC | Age: 42
End: 2022-03-02

## 2022-03-02 NOTE — TELEPHONE ENCOUNTER
LVM for pt to schedule with Dr. Eduarda Diaz. If pt returns call please schedule in first available new patient slot.

## 2022-03-11 ENCOUNTER — OFFICE VISIT (OUTPATIENT)
Dept: PSYCHOLOGY | Age: 42
End: 2022-03-11
Payer: COMMERCIAL

## 2022-03-11 DIAGNOSIS — R41.840 INATTENTION: Primary | ICD-10-CM

## 2022-03-11 PROCEDURE — 90791 PSYCH DIAGNOSTIC EVALUATION: CPT | Performed by: PSYCHOLOGIST

## 2022-03-11 NOTE — PROGRESS NOTES
Behavioral Health Consultation  Chanetta Fabry, M.A. Behavioral Health Consultant   Psychology Trainee  Seema Schmidt, Ph.D.  Psychology Supervisor  3/11/2022  9:04 AM      Time spent with Patient: 30 minutes  This is patient's first [facility] appointment. Reason for Consult: Inattention, anxiety, depression    Pt provided informed consent for the behavioral health program. Discussed with patient model of service to include the limits of confidentiality (i.e. abuse reporting, suicide intervention, etc.) and short-term intervention focused approach. Pt indicated understanding. Discussed the nature of supervision; pt consented and signed supervision consent form. Feedback given to PCP. S:  Pt seen per PCP re: Inattention concerns, depression, anxiety    Patient reported depressive symptoms, including deactivation, social isolation, and poor concentration/focus. Symptoms have been present for 4-5 years and is made worse by procrastinating and projects looming at work. Pt noted that he does experience a low mood and poor self-worth; however, he identifies these sxs as direct consequences of his attention/concentration issues. Pt reported symptoms of anxiety, including irritability and poor concentration/focus. Pt noted that he is having performance issues at work, as he is having a hard time getting things done and focusing on his projects at work. Pt currently works at home, and he noted he has a more difficult time focusing on his work from home than he does when he has worked outside of home. Pt really started recognizing his inattention and concentration sxs 10 years ago in work; however, he noted that looking back, he can recall recognizing similar sxs in childhood starting in 5th or 6th grade. Pt noted if he is passionate about a topic, he is able to focus on things. DSM 5 Diagnostic Interview  ADHD    A.   Inattention inclusion: pattern of behavior, onset before 12 years, present in multiple settings and gives rise to social, educational, or work performance difficulties. Persistently present for 6 months, inconsistent w developmental level. at least six of the following symptoms of inattention. 1. Overlooks details: Over at least the last 6 months, have other people told you that you often overlook or miss details, or that you made careless mistakes in your work? No      Examples:  N/A  2. Task inattention: Do you often have difficulty staying focused on a task or activity, such as reading a lengthy writing or listening to a lecture or conversation? Yes   Examples:  Pt noted he can focus on reading for pleasure, but if he has to read something for work (currently) or school (childhood) that he is not interested in, he cannot focus. 3. Appears not to listen: Do other people tell you that when they speak to you, your mind often seems to be elsewhere or that it seems like you are not listening? No   Examples:  N/A  4. Fails to finish tasks: Do you often struggle to finish schoolwork, chores, or work assignments because you lose focus or are easily sidetracked? Yes   Examples:  Pt reported that he currently sets aside time for tasks at work, but finds himself staring at the task or doing something else instead. In childhood, pt noted he would not complete homework on time and turn it in late due to lack of focus. 5. Difficulty organizing tasks: Do you find it difficult to organize tasks or activities? Do you struggle with time management or fail to meet deadlines? Yes   Examples:  Pt reported turning in homework late (childhood) and failing to complete necessary tasks for work (currently). Will continue assessing for ADHD sxs during next visit. Current Living Situation: Pt currently lives in a house with his wife, two daughters, dog, and hamster. He noted that overall, things at home are going well and he does not think that his issues with attention impact his life at home.  Pt reported being close with his parents and his wife's parents, neighbors, and friends from [employer]. 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 Pt currently works at [employer] and works in [employer]. He noted his focus is on backing up data, which he enjoys. 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 Interests: Learning/playing [employer], [employer], using [employer], woodworking, house remodeling, video games, playing on phone, and social media. Caffeine: 1 cup of coffee 4-5x/week and 2 sweet teas or 2 cokes/day. O:  MSE:    Appearance    alert, cooperative  Impulsive behavior No  Speech    spontaneous, normal rate, normal volume and well articulated  Mood    Anxious  Affect    normal affect  Thought Content    intact  Thought Process    linear and coherent  Associations    logical connections  Insight    Fair  Judgment    Intact  Orientation    oriented to person, place, time, and general circumstances  Memory    recent and remote memory intact  Attention/Concentration    intact  Morbid ideation No  Suicide Assessment    no suicidal ideation    History:    Social History:   Social History     Socioeconomic History    Marital status:      Spouse name: Not on file    Number of children: Not on file    Years of education: Not on file    Highest education level: Not on file   Occupational History    Not on file   Tobacco Use    Smoking status: Never Smoker    Smokeless tobacco: Never Used   Vaping Use    Vaping Use: Never used   Substance and Sexual Activity    Alcohol use:  Yes     Alcohol/week: 1.0 standard drink     Types: 1 Shots of liquor per week     Comment: ONCE A MONTH    Drug use: Never    Sexual activity: Yes   Other Topics Concern    Not on file   Social History Narrative    Not on file     Social Determinants of Health     Financial Resource Strain:    Sandra Cramer Difficulty of Paying Living Expenses: Not on file   Food Insecurity:     Worried About Running Out of Food in the Last Year: Not on file    Ran Out of Food in the Last Year: Not on file   Transportation Needs:     Lack of Transportation (Medical): Not on file    Lack of Transportation (Non-Medical): Not on file   Physical Activity:     Days of Exercise per Week: Not on file    Minutes of Exercise per Session: Not on file   Stress:     Feeling of Stress : Not on file   Social Connections:     Frequency of Communication with Friends and Family: Not on file    Frequency of Social Gatherings with Friends and Family: Not on file    Attends Evangelical Services: Not on file    Active Member of [address] [place] [address] [employer] or Organizations: Not on file    Attends Club or Organization Meetings: Not on file    Marital Status: Not on file   Intimate Partner Violence:     Fear of Current or Ex-Partner: Not on file    Emotionally Abused: Not on file    Physically Abused: Not on file    Sexually Abused: Not on file   Housing Stability:     Unable to Pay for Housing in the Last Year: Not on file    Number of [place] in the Last Year: Not on file    Unstable Housing in the Last Year: Not on file     TOBACCO:   reports that he has never smoked. He has never used smokeless tobacco.  ETOH:   reports current alcohol use of about 1.0 standard drink of alcohol per week. 2-3x/month, consume 1 beer or 1-2 glasses of bourbon/occasion. Drugs: Denied    A:  PHQ-9 was not administered at this time. PHQ Scores 2/17/2022 1/19/2021 3/18/2019 9/17/2018 1/6/2017   PHQ2 Score 0 0 0 0 0   PHQ9 Score 0 0 0 0 0     Interpretation of Total Score Depression Severity: 1-4 = Minimal depression, 5-9 = Mild depression, 10-14 = Moderate depression, 15-19 = Moderately severe depression, 20-27 = Severe depression      Diagnosis:  Attention concerns    Will further assess for ADHD at next visit.      Plan:  Pt interventions:  Established rapport, Conducted functional assessment, [place]-setting to identify pt's primary goals for [facility] visit / overall health and Supportive techniques

## 2022-03-25 ENCOUNTER — OFFICE VISIT (OUTPATIENT)
Dept: PSYCHOLOGY | Age: 42
End: 2022-03-25
Payer: COMMERCIAL

## 2022-03-25 DIAGNOSIS — F90.0 ATTENTION DEFICIT HYPERACTIVITY DISORDER (ADHD), PREDOMINANTLY INATTENTIVE TYPE: Primary | ICD-10-CM

## 2022-03-25 PROCEDURE — 90832 PSYTX W PT 30 MINUTES: CPT | Performed by: PSYCHOLOGIST

## 2022-03-25 NOTE — PROGRESS NOTES
Behavioral Health Consultation  Bright Arora M.A. Behavioral Health Consultant  Psychology Trainee  Alyson Santana, Ph.D.  Psychology Supervisor  3/25/2022  8:59 AM      Time spent with Patient: 23 minutes  This is patient's second  [facility] appointment. Reason for Consult:    Chief Complaint   Patient presents with   Snell Other     ADHD Evaluation       Feedback given to PCP. S:  Pt seen for f/u of ADHD evaluation. Pt reported similar mood and sxs noting that he is still struggling with inattention. Pt indicated that he primarily has occupational issues due to his inattention; however, his wife describes him getting sidetracked while performing household activities like cleaning the home office. Pt forgot to bring in completed ASRS questionnaires, encouraged pt to drop these off at the  at his earliest convenience. Pt noted he is transitioning to 2 days in the office and 3 days at home, while he does better in the office because of the structure, he still struggles with attention/concentration issues. DSM 5 Diagnostic Interview  ADHD    A. Inattention inclusion: pattern of behavior, onset before 12 years, present in multiple settings and gives rise to social, educational, or work performance difficulties. Persistently present for 6 months, inconsistent w developmental level. at least six of the following symptoms of inattention. 1. Overlooks details: Over at least the last 6 months, have other people told you that you often overlook or miss details, or that you made careless mistakes in your work? No      Examples:  N/A  2. Task inattention: Do you often have difficulty staying focused on a task or activity, such as reading a lengthy writing or listening to a lecture or conversation? Yes   Examples:  Pt noted he can focus on reading for pleasure, but if he has to read something for work (currently) or school (childhood) that he is not interested in, he cannot focus.    3. Appears not to listen: Do other people tell you that when they speak to you, your mind often seems to be elsewhere or that it seems like you are not listening? No   Examples:  N/A  4. Fails to finish tasks: Do you often struggle to finish schoolwork, chores, or work assignments because you lose focus or are easily sidetracked? Yes   Examples:  Pt reported that he currently sets aside time for tasks at work, but he finds himself staring at the task or doing something else instead. In childhood, pt noted he would not complete homework on time and turn it in late due to lack of focus  5. Difficulty organizing tasks: Do you find it difficult to organize tasks or activities? Do you struggle with time management or fail to meet deadlines? Yes   Examples:  Pt reported turning in homework late (childhood) and failing to complete necessary tasks for work (currently). 6. Avoids tasks requiring sustained mental activity: Do you often avoid tasks that require sustained mental effort? Yes   Examples:  Pt noted that his wife has commented on pt's difficulty cleaning and organizing in the home. In childhood, pt's mother would comment how pt would complete homework for subjects he enjoyed, but would procrastinate homework and projects for subjects he did not enjoy. 7. Often loses things necessary for tasks: Do you often lose things that are essential for tasks or activities, such as school materials, books, tools, wallets, keys, paperwork, eyeglasses, or your phone? No   Examples:  N/A  8. Easily distracted: Do you find that you are often easily distracted by things or thoughts unrelated to the activity or task your are supposed to be doing? Yes   Examples:  Now: Pt reported being distracted by conversations at work or his phone/computer when working from home. Childhood: Pt reported being distracted by the TV when he was supposed to do other tasks in childhood.    9. Often forgetful: Do you find, or do other peopled find, that you are often forgetful in your daily activities? No   Examples:  N/A    B. Hyperactivity/impulsivity inclusion: Alternatively, requires the presence of at least six of the following manifestations of hyperactivity and impulsivity over the same course. 1. Fidgets: Over the last 6 months, have you often found yourself fidgeting with your hands or feet? Do you find it hard to sit without squirming? Yes   Examples:  Pt was fidgeting feet and changing positions in visit. Pt finds himself fidgeting and changing positions at work. Childhood: need tactile stimulation. 2. Leaves seat: When you are in a situation where you are expected to sit, do you often leave your seat? No   Examples:  N/A  3. Runs or climbs: Do you often find yourself running around or climbing in a situation where doing so is inappropriate? No   Examples:  N/A  4. Unable to maintain quiet: Do you often find yourself unable to play or engage in leisure activities quietly? No   Examples:  N/A  5. Hyperactivity: Do you often feel as if you are, or do other people describe you as always being, on the go or acting as if you were driven by a motor ? Do you find it uncomfortable to sit still for an extended time? No   Examples:  N/A  6. Talks excessively: Do you often talk excessively? No   Examples:  N/A  7. Blurts answers: Do often struggle to wait your turn in a conversation? Do you often complete other peoples sentences or blurt out an answer before a question has been completed? No   Examples:  N/A  8. Struggles to take turns: Do you often have difficulty waiting your turn or waiting in line? N/A   Examples:  N/A  9. Interrupts or intrudes: Do you often butt into other peoples activities, conversations, or games? No   Do you often start using other peoples things without permission? No   Examples:  N/A    D. Modifiers  1. Specifiers:  a.  Combined presentation: If both inattention and hyperactivity-impulsivity criteria are met for the past 6 months.  b. Predominantly inattentive presentation: If inattention criteria are met but hyperactivity-impulsivity criteria have not been met for the past 6 months.  c. Predominantly hyperactive/impulsive presentation: If hyperactivity-impulsivity criteria are met and inattention criteria have not been met for the past 6 months. ASRS: The ASRS is an adult self-report scale of current symptoms of ADHD and their reported impact on present functioning. Pt endorsed 5 out of 6 possible symptom based items as impacting functioning. Generally 4 or more endorsements is considered to be consistent with ADHD. Additionally he endorsed 4 out of 12 possible items as negatively impacting functioning. Pt's wife endorsed 5 out of 6 possible symptom based items as impacting the pt's functioning. Additionally, she endorsed 9 out of 12 possible items as negatively impacting functioning. Addendum was added to account for collection of collateral information regarding pt's sxs from his wife. Pt's wife endorsed additional sxs of ADHD in multiple areas of pt's life including forgetfulness, appearing not to listen, and losing things necessary to complete tasks. 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     O:  MSE:    Appearance    alert, cooperative  Appetite normal  Sleep disturbance No  Fatigue No  Loss of pleasure No  Impulsive behavior No  Speech    spontaneous, normal rate, normal volume and well articulated  Mood    Anxious  Affect    normal affect  Thought Content    intact  Thought Process    linear and coherent  Associations    logical connections  Insight    Fair  Judgment    Intact  Orientation    oriented to person, place, time, and general circumstances  Memory    recent and remote memory intact  Attention/Concentration    intact  Morbid ideation No  Suicide Assessment    no suicidal ideation    History:  Social History: Social History     Socioeconomic History    Marital status:      Spouse name: Not on file    Number of children: Not on file    Years of education: Not on file    Highest education level: Not on file   Occupational History    Not on file   Tobacco Use    Smoking status: Never Smoker    Smokeless tobacco: Never Used   Vaping Use    Vaping Use: Never used   Substance and Sexual Activity    Alcohol use: Yes     Alcohol/week: 1.0 standard drink     Types: 1 Shots of liquor per week     Comment: ONCE A MONTH    Drug use: Never    Sexual activity: Yes   Other Topics Concern    Not on file   Social History Narrative    Not on file     Social Determinants of Health     Financial Resource Strain:     Difficulty of Paying Living Expenses: Not on file   Food Insecurity:     Worried About Running Out of Food in the Last Year: Not on file    Sunny of Food in the Last Year: Not on file   Transportation Needs:     Lack of Transportation (Medical): Not on file    Lack of Transportation (Non-Medical):  Not on file   Physical Activity:     Days of Exercise per Week: Not on file    Minutes of Exercise per Session: Not on file   Stress:     Feeling of Stress : Not on file   Social Connections:     Frequency of Communication with Friends and Family: Not on file    Frequency of Social Gatherings with Friends and Family: Not on file    Attends Episcopalian Services: Not on file    Active Member of [address] [place] [address] or Organizations: Not on file    Attends Club or Organization Meetings: Not on file    Marital Status: Not on file   Intimate Partner Violence:     Fear of Current or Ex-Partner: Not on file    Emotionally Abused: Not on file    Physically Abused: Not on file    Sexually Abused: Not on file   Housing Stability:     Unable to Pay for Housing in the Last Year: Not on file    Number of [place] in the Last Year: Not on file    Unstable Housing in the Last Year: Not on file     TOBACCO:   reports that he has never smoked. He has never used smokeless tobacco.  ETOH:   reports current alcohol use of about 1.0 standard drink of alcohol per week. A:  PHQ-9 was not administered at this time.      PHQ Scores 2/17/2022 1/19/2021 3/18/2019 9/17/2018 1/6/2017   PHQ2 Score 0 0 0 0 0   PHQ9 Score 0 0 0 0 0     Interpretation of Total Score Depression Severity: 1-4 = Minimal depression, 5-9 = Mild depression, 10-14 = Moderate depression, 15-19 = Moderately severe depression, 20-27 = Severe depression        Diagnosis:  Attention deficit hyperactivity disorder (ADHD), predominantly inattentive type      Plan:  Pt interventions:  Conducted functional assessment, [place]-setting to identify pt's primary goals for [facility] visit / overall health and Supportive techniques

## 2022-03-30 ENCOUNTER — TELEPHONE (OUTPATIENT)
Dept: PSYCHOLOGY | Age: 42
End: 2022-03-30

## 2022-03-30 NOTE — TELEPHONE ENCOUNTER
Called to obtain verbal consent to discuss pt's symptoms with his wife and his mother. Pt provided verbal consent to discuss symptoms with both his wife and mother.

## 2022-04-15 ENCOUNTER — TELEMEDICINE (OUTPATIENT)
Dept: PSYCHOLOGY | Age: 42
End: 2022-04-15
Payer: COMMERCIAL

## 2022-04-15 DIAGNOSIS — F90.0 ATTENTION DEFICIT HYPERACTIVITY DISORDER (ADHD), PREDOMINANTLY INATTENTIVE TYPE: Primary | ICD-10-CM

## 2022-04-15 PROCEDURE — 90832 PSYTX W PT 30 MINUTES: CPT | Performed by: PSYCHOLOGIST

## 2022-04-15 NOTE — PROGRESS NOTES
Behavioral Health Consultation  Kunal Ji M.A. Behavioral Health Consultant  Psychology Trainee  Camila Allen, Ph.D.  Psychology Supervisor  4/15/2022  10:00 AM      Time spent with Patient: 22 minutes  This is patient's third  [facility] appointment. Reason for Consult:  No chief complaint on file. Feedback given to PCP. Pursuant to the emergency declaration under the [address] [place] [address], [facility] waiver authority and the [employer] Act, this Virtual Visit was conducted, with patient's consent, to reduce the patient's risk of exposure to COVID-19 and provide continuity of care for an established patient. Services were provided through a video synchronous discussion virtually to substitute for in-person clinic visit. Pt gave verbal informed consent to participate in telehealth services. The patient (or guardian if applicable) is aware that this is a billable service, which includes applicable co-pays. This virtual visit was conducted with the patient's (and/or legal guardian's) consent. Conducted a risk-benefit analysis and determined that the patient's presenting problems are consistent with the use of telepsychology. Determined that the patient has sufficient knowledge and skills in the use of technology enabling them to adequately benefit from telepsychology. It was determined that this patient was able to be properly treated without an in-person session. Patient verified that they were currently located at the [facility] address that was provided during registration and patient was located in a state that the provider was licensed to provide care.     Verified the following information:  Patient's identification: Yes  Patient location: [address] [place] [address]   Patient's call back number: [phone]   Patient's emergency contact's name and number, as well as permission to contact them if needed: Extended Emergency Contact Information  Primary Emergency Contact: [facility]  Address: [address], [address] [facility] [address] [facility] Phone: [phone]  Mobile Phone: [phone]  Relation: Spouse  Secondary Emergency Contact: Deangelo Ballard of [address] [facility] Phone: [phone]  Relation: Parent     Provider location: Jazmin Sill:  Pt seen for f/u of ADHD. Pt reported similar mood and sxs. Provided psychoeducation about ADHD and treatment options. Discussed various behavioral strategies including pros and cons of procrastinating, strategically scheduling activities during more focused times, and taking 5 minute breaks each hour. O:  MSE:    Appearance    alert, cooperative  Appetite normal  Sleep disturbance No  Fatigue No  Loss of pleasure No  Impulsive behavior No  Speech    spontaneous, normal rate, normal volume and well articulated  Mood    Euthymic   Affect    normal affect  Thought Content    intact  Thought Process    linear and coherent  Associations    logical connections  Insight    Fair  Judgment    Intact  Orientation    oriented to person, place, time, and general circumstances  Memory    recent and remote memory intact  Attention/Concentration    intact  Morbid ideation No  Suicide Assessment    no suicidal ideation    History:  Social History:   Social History     Socioeconomic History    Marital status:      Spouse name: Not on file    Number of children: Not on file    Years of education: Not on file    Highest education level: Not on file   Occupational History    Not on file   Tobacco Use    Smoking status: Never Smoker    Smokeless tobacco: Never Used   Vaping Use    Vaping Use: Never used   Substance and Sexual Activity    Alcohol use:  Yes     Alcohol/week: 1.0 standard drink     Types: 1 Shots of liquor per week     Comment: ONCE A MONTH    Drug use: Never    Sexual activity: Yes   Other Topics Concern    Not on file   Social History Narrative    Not on file     Social Determinants of Health     Financial Resource Strain:     Difficulty of Paying Living Expenses: Not on file   Food Insecurity:     Worried About Running Out of Food in the Last Year: Not on file    Sunny of Food in the Last Year: Not on file   Transportation Needs:     Lack of Transportation (Medical): Not on file    Lack of Transportation (Non-Medical): Not on file   Physical Activity:     Days of Exercise per Week: Not on file    Minutes of Exercise per Session: Not on file   Stress:     Feeling of Stress : Not on file   Social Connections:     Frequency of Communication with Friends and Family: Not on file    Frequency of Social Gatherings with Friends and Family: Not on file    Attends Hinduism Services: Not on file    Active Member of [address] [place] [address] [employer] or Organizations: Not on file    Attends Club or Organization Meetings: Not on file    Marital Status: Not on file   Intimate Partner Violence:     Fear of Current or Ex-Partner: Not on file    Emotionally Abused: Not on file    Physically Abused: Not on file    Sexually Abused: Not on file   Housing Stability:     Unable to Pay for Housing in the Last Year: Not on file    Number of [place] in the Last Year: Not on file    Unstable Housing in the Last Year: Not on file     TOBACCO:   reports that he has never smoked. He has never used smokeless tobacco.  ETOH:   reports current alcohol use of about 1.0 standard drink of alcohol per week. A:  PHQ-9 was not administered at this time. PHQ Scores 2/17/2022 1/19/2021 3/18/2019 9/17/2018 1/6/2017   PHQ2 Score 0 0 0 0 0   PHQ9 Score 0 0 0 0 0     Interpretation of Total Score Depression Severity: 1-4 = Minimal depression, 5-9 = Mild depression, 10-14 = Moderate depression, 15-19 = Moderately severe depression, 20-27 = Severe depression        Diagnosis:  1.  Attention deficit hyperactivity disorder (ADHD), predominantly inattentive type Plan:  Pt interventions:  Supportive techniques, Provided Psychoeducation re: ADHD treatment options and Identified relevant behavioral strategies for targeting ADHD including pros and cons of procrastinating, strategically scheduling activities, and breaks

## 2022-05-04 ENCOUNTER — TELEMEDICINE (OUTPATIENT)
Dept: INTERNAL MEDICINE CLINIC | Age: 42
End: 2022-05-04
Payer: COMMERCIAL

## 2022-05-04 DIAGNOSIS — F98.8 ATTENTION DEFICIT DISORDER (ADD) WITHOUT HYPERACTIVITY: Primary | ICD-10-CM

## 2022-05-04 PROCEDURE — 99213 OFFICE O/P EST LOW 20 MIN: CPT | Performed by: NURSE PRACTITIONER

## 2022-05-04 RX ORDER — DEXTROAMPHETAMINE SACCHARATE, AMPHETAMINE ASPARTATE MONOHYDRATE, DEXTROAMPHETAMINE SULFATE AND AMPHETAMINE SULFATE 5; 5; 5; 5 MG/1; MG/1; MG/1; MG/1
20 CAPSULE, EXTENDED RELEASE ORAL EVERY MORNING
Qty: 30 CAPSULE | Refills: 0 | Status: SHIPPED | OUTPATIENT
Start: 2022-05-04 | End: 2022-06-03

## 2022-05-04 NOTE — PROGRESS NOTES
2022    TELEHEALTH EVALUATION -- Audio/Visual (During [place] [address] public health emergency)    HPI:    Steve Antony (:  1980) has requested an audio/video evaluation for the following concern(s):    Follow-up for concern about adult ADD. Previously reported concerns with focus. This was affecting his work. He was amenable to a referral to psychology for assessment. He has been seen on a couple of occasions by psychology and diagnosed with adult ADD. He feels the visits with psychology have been helpful but he is interested in starting medication to improve his attention. Review of Systems   Psychiatric/Behavioral: Positive for decreased concentration. All other systems reviewed and are negative. Prior to Visit Medications    Medication Sig Taking? Authorizing Provider   pantoprazole (PROTONIX) 40 MG tablet Take 1 tablet by mouth daily  OLYA Fair - CNP   Multiple Vitamins-Minerals (MULTIVITAMIN ADULT PO) Take by mouth  Historical Provider, MD   Cholecalciferol (VITAMIN D3) 125 MCG (5000 UT) TABS Take by mouth  Historical Provider, MD   Loratadine (CLARITIN PO) Take by mouth nightly   Historical Provider, MD       Social History     Tobacco Use    Smoking status: Never Smoker    Smokeless tobacco: Never Used   Vaping Use    Vaping Use: Never used   Substance Use Topics    Alcohol use:  Yes     Alcohol/week: 1.0 standard drink     Types: 1 Shots of liquor per week     Comment: ONCE A MONTH    Drug use: Never            PHYSICAL EXAMINATION:  [ INSTRUCTIONS:  \"[x]\" Indicates a positive item  \"[]\" Indicates a negative item  -- DELETE ALL ITEMS NOT EXAMINED]  Vital Signs: (As obtained by patient/caregiver or practitioner observation)    Blood pressure-  Heart rate-    Respiratory rate-    Temperature-  Pulse oximetry-     Constitutional: [x] Appears well-developed and well-nourished [x] No apparent distress      [] Abnormal-   Mental status  [x] Alert and awake  [x] Oriented to person/place/time [x]Able to follow commands      Eyes:  EOM    [x]  Normal  [] Abnormal-  Sclera  [x]  Normal  [] Abnormal -         Discharge [x]  None visible  [] Abnormal -    HENT:   [x] Normocephalic, atraumatic. [] Abnormal   [x] Mouth/Throat: Mucous membranes are moist.     External Ears [x] Normal  [] Abnormal-     Neck: [x] No visualized mass     Pulmonary/Chest: [x] Respiratory effort normal.  [x] No visualized signs of difficulty breathing or respiratory distress        [] Abnormal-      Musculoskeletal:   [x] Normal gait with no signs of ataxia         [x] Normal range of motion of neck        [] Abnormal-       Neurological:        [x] No Facial Asymmetry (Cranial nerve 7 motor function) (limited exam to video visit)          [x] No gaze palsy        [] Abnormal-         Skin:        [x] No significant exanthematous lesions or discoloration noted on facial skin         [] Abnormal-            Psychiatric:       [x] Normal Affect [x] No Hallucinations        [] Abnormal-     Other pertinent observable physical exam findings-       ASSESSMENT/PLAN:  1. Attention deficit disorder (ADD) without hyperactivity  - Dx confirmed by psychology  - Treatment options discussed and he would like to proceed with stimulant  - Will start with daily adderall and see him back in one month    - amphetamine-dextroamphetamine (ADDERALL XR) 20 MG extended release capsule; Take 1 capsule by mouth every morning for 30 days. Dispense: 30 capsule; Refill: 0    Controlled Substance Monitoring:  Acute and Chronic Pain Monitoring:   RX Monitoring 5/4/2022   Periodic Controlled Substance Monitoring No signs of potential drug abuse or diversion identified. No follow-ups on file. Millieperfecto Rambo, was evaluated through a synchronous (real-time) audio-video encounter. The patient (or guardian if applicable) is aware that this is a billable service, which includes applicable co-pays.  This Virtual Visit was conducted with patient's (and/or legal guardian's) consent. The visit was conducted pursuant to the emergency declaration under the [address] [place] [address] authority and the [employer] Act. Patient identification was verified, and a caregiver was present when appropriate. The patient was located at home in a state where the provider was licensed to provide care. Total time spent on this encounter: Not billed by time    --OLYA Lal CNP on 5/4/2022 at 8:43 AM    An electronic signature was used to authenticate this note.

## 2022-05-06 ENCOUNTER — TELEMEDICINE (OUTPATIENT)
Dept: PSYCHOLOGY | Age: 42
End: 2022-05-06
Payer: COMMERCIAL

## 2022-05-06 DIAGNOSIS — F90.0 ATTENTION DEFICIT HYPERACTIVITY DISORDER (ADHD), PREDOMINANTLY INATTENTIVE TYPE: Primary | ICD-10-CM

## 2022-05-06 PROCEDURE — 90832 PSYTX W PT 30 MINUTES: CPT | Performed by: PSYCHOLOGIST

## 2022-05-06 NOTE — PROGRESS NOTES
Behavioral Health Consultation  Viridiana Torres M.A. Behavioral Health Consultant  Psychology Trainee  Patrick Borden, Ph.D.  Psychology Supervisor  5/6/2022  9:32 AM      Time spent with Patient: 30 minutes  This is patient's fourth  [facility] appointment. Reason for Consult: ADHD    Feedback given to PCP. Pursuant to the emergency declaration under the [address] [place] [address], [facility] waiver authority and the [employer] Act, this Virtual Visit was conducted, with patient's consent, to reduce the patient's risk of exposure to COVID-19 and provide continuity of care for an established patient. Services were provided through a video synchronous discussion virtually to substitute for in-person clinic visit. Pt gave verbal informed consent to participate in telehealth services. The patient (or guardian if applicable) is aware that this is a billable service, which includes applicable co-pays. This virtual visit was conducted with the patient's (and/or legal guardian's) consent. Conducted a risk-benefit analysis and determined that the patient's presenting problems are consistent with the use of telepsychology. Determined that the patient has sufficient knowledge and skills in the use of technology enabling them to adequately benefit from telepsychology. It was determined that this patient was able to be properly treated without an in-person session. Patient verified that they were currently located at the [facility] address that was provided during registration and patient was located in a state that the provider was licensed to provide care.     Verified the following information:  Patient's identification: Yes  Patient location: [address] [place] [address]   Patient's call back number: [phone]   Patient's emergency contact's name and number, as well as permission to contact them if needed: Extended Emergency Contact Information  Primary Emergency Contact: [facility]  Address: [address], [address] Vince Kocher of [address]  Phone: [phone]  Mobile Phone: [phone]  Relation: Spouse  Secondary Emergency Contact: Miriam Ballard of [address]  Phone: [phone]  Relation: Parent     Provider location: Moraima Mittal:  Pt seen for f/u of ADHD. Pt reported improved mood and sxs noting that he has felt improved attention at home today. Collaboratively discussed setting reminders to help with memory. Set a goal to workout 3 days/week for 30 minutes. Problem solved barriers. O:  MSE:    Appearance    alert, cooperative  Appetite normal  Sleep disturbance No  Fatigue No  Loss of pleasure No  Impulsive behavior No  Speech    spontaneous, normal rate, normal volume and well articulated  Mood    Euthymic   Affect    normal affect  Thought Content    intact  Thought Process    linear and coherent  Associations    logical connections  Insight    Fair  Judgment    Intact  Orientation    oriented to person, place, time, and general circumstances  Memory    recent and remote memory intact  Attention/Concentration    intact  Morbid ideation No  Suicide Assessment    no suicidal ideation    History:  Social History:   Social History     Socioeconomic History    Marital status:      Spouse name: Not on file    Number of children: Not on file    Years of education: Not on file    Highest education level: Not on file   Occupational History    Not on file   Tobacco Use    Smoking status: Never Smoker    Smokeless tobacco: Never Used   Vaping Use    Vaping Use: Never used   Substance and Sexual Activity    Alcohol use:  Yes     Alcohol/week: 1.0 standard drink     Types: 1 Shots of liquor per week     Comment: ONCE A MONTH    Drug use: Never    Sexual activity: Yes   Other Topics Concern    Not on file   Social History Narrative    Not on file     Social Determinants of Health     Financial Resource Strain:     Difficulty of Paying Living Expenses: Not on file   Food Insecurity:     Worried About Running Out of Food in the Last Year: Not on file    Sunny of Food in the Last Year: Not on file   Transportation Needs:     Lack of Transportation (Medical): Not on file    Lack of Transportation (Non-Medical): Not on file   Physical Activity:     Days of Exercise per Week: Not on file    Minutes of Exercise per Session: Not on file   Stress:     Feeling of Stress : Not on file   Social Connections:     Frequency of Communication with Friends and Family: Not on file    Frequency of Social Gatherings with Friends and Family: Not on file    Attends Mormon Services: Not on file    Active Member of [address] [place] [address] [employer] or Organizations: Not on file    Attends Club or Organization Meetings: Not on file    Marital Status: Not on file   Intimate Partner Violence:     Fear of Current or Ex-Partner: Not on file    Emotionally Abused: Not on file    Physically Abused: Not on file    Sexually Abused: Not on file   Housing Stability:     Unable to Pay for Housing in the Last Year: Not on file    Number of [place] in the Last Year: Not on file    Unstable Housing in the Last Year: Not on file     TOBACCO:   reports that he has never smoked. He has never used smokeless tobacco.  ETOH:   reports current alcohol use of about 1.0 standard drink of alcohol per week. A:  PHQ-9 was not administered at this time. PHQ Scores 2/17/2022 1/19/2021 3/18/2019 9/17/2018 1/6/2017   PHQ2 Score 0 0 0 0 0   PHQ9 Score 0 0 0 0 0     Interpretation of Total Score Depression Severity: 1-4 = Minimal depression, 5-9 = Mild depression, 10-14 = Moderate depression, 15-19 = Moderately severe depression, 20-27 = Severe depression        Diagnosis:  1.  Attention deficit hyperactivity disorder (ADHD), predominantly inattentive type          Plan:  Pt interventions:  Supportive techniques and Collaboratively set goals with pt re: setting reminders and exercise 3x/week for 30 minutes

## 2022-06-03 ENCOUNTER — OFFICE VISIT (OUTPATIENT)
Dept: INTERNAL MEDICINE CLINIC | Age: 42
End: 2022-06-03
Payer: COMMERCIAL

## 2022-06-03 VITALS
DIASTOLIC BLOOD PRESSURE: 78 MMHG | HEART RATE: 75 BPM | OXYGEN SATURATION: 95 % | HEIGHT: 71 IN | BODY MASS INDEX: 36.68 KG/M2 | SYSTOLIC BLOOD PRESSURE: 112 MMHG | WEIGHT: 262 LBS

## 2022-06-03 DIAGNOSIS — F98.8 ATTENTION DEFICIT DISORDER (ADD) WITHOUT HYPERACTIVITY: ICD-10-CM

## 2022-06-03 PROCEDURE — 99213 OFFICE O/P EST LOW 20 MIN: CPT | Performed by: NURSE PRACTITIONER

## 2022-06-03 RX ORDER — DEXTROAMPHETAMINE SACCHARATE, AMPHETAMINE ASPARTATE MONOHYDRATE, DEXTROAMPHETAMINE SULFATE AND AMPHETAMINE SULFATE 7.5; 7.5; 7.5; 7.5 MG/1; MG/1; MG/1; MG/1
30 CAPSULE, EXTENDED RELEASE ORAL DAILY
Qty: 30 CAPSULE | Refills: 0 | Status: SHIPPED | OUTPATIENT
Start: 2022-08-02 | End: 2022-08-31 | Stop reason: SDUPTHER

## 2022-06-03 RX ORDER — DEXTROAMPHETAMINE SACCHARATE, AMPHETAMINE ASPARTATE MONOHYDRATE, DEXTROAMPHETAMINE SULFATE AND AMPHETAMINE SULFATE 7.5; 7.5; 7.5; 7.5 MG/1; MG/1; MG/1; MG/1
30 CAPSULE, EXTENDED RELEASE ORAL DAILY
Qty: 30 CAPSULE | Refills: 0 | Status: SHIPPED | OUTPATIENT
Start: 2022-07-03 | End: 2022-08-31 | Stop reason: SDUPTHER

## 2022-06-03 RX ORDER — DEXTROAMPHETAMINE SACCHARATE, AMPHETAMINE ASPARTATE MONOHYDRATE, DEXTROAMPHETAMINE SULFATE AND AMPHETAMINE SULFATE 7.5; 7.5; 7.5; 7.5 MG/1; MG/1; MG/1; MG/1
30 CAPSULE, EXTENDED RELEASE ORAL DAILY
Qty: 30 CAPSULE | Refills: 0 | Status: SHIPPED | OUTPATIENT
Start: 2022-06-03 | End: 2022-08-31 | Stop reason: SDUPTHER

## 2022-06-03 RX ORDER — DEXTROAMPHETAMINE SACCHARATE, AMPHETAMINE ASPARTATE MONOHYDRATE, DEXTROAMPHETAMINE SULFATE AND AMPHETAMINE SULFATE 5; 5; 5; 5 MG/1; MG/1; MG/1; MG/1
20 CAPSULE, EXTENDED RELEASE ORAL EVERY MORNING
Qty: 30 CAPSULE | Refills: 0 | Status: CANCELLED | OUTPATIENT
Start: 2022-06-03 | End: 2022-07-03

## 2022-06-03 SDOH — ECONOMIC STABILITY: FOOD INSECURITY: WITHIN THE PAST 12 MONTHS, YOU WORRIED THAT YOUR FOOD WOULD RUN OUT BEFORE YOU GOT MONEY TO BUY MORE.: NEVER TRUE

## 2022-06-03 SDOH — ECONOMIC STABILITY: FOOD INSECURITY: WITHIN THE PAST 12 MONTHS, THE FOOD YOU BOUGHT JUST DIDN'T LAST AND YOU DIDN'T HAVE MONEY TO GET MORE.: NEVER TRUE

## 2022-06-03 ASSESSMENT — SOCIAL DETERMINANTS OF HEALTH (SDOH): HOW HARD IS IT FOR YOU TO PAY FOR THE VERY BASICS LIKE FOOD, HOUSING, MEDICAL CARE, AND HEATING?: NOT HARD AT ALL

## 2022-06-03 NOTE — PROGRESS NOTES
SUBJECTIVE:    Patient ID: Luz Maria Ocampo is a 43 y.o. male. CC: Adult ADD    HPI: Patient presents to the office today for 1 month follow-up of adult ADD. Patient has been evaluated in the office recently for concerns about ADD. He was given a referral to psychology who felt he did have evidence of adult attention deficit disorder. After discussions regarding this disease, the patient opted for treatment. He was started on Adderall XR 20 mg daily. Today, the patient reports he has been taking the medication for the past month. For the first 2 weeks he had great improvement of his attention. He feels his quality of work improved dramatically. He had no side effects. After couple of weeks the benefit of the medication seem to wean. He still has no side effects but does not feel the medicine is as effective as it was. Past Medical History:   Diagnosis Date    Allergic rhinitis     Dizziness     GERD (gastroesophageal reflux disease)     Headache     Hyperglycemia     JURGEN on CPAP     Recurrent upper respiratory infection (URI)     Sleep apnea     Tinnitus         Current Outpatient Medications   Medication Sig Dispense Refill    [START ON 8/2/2022] amphetamine-dextroamphetamine (ADDERALL XR) 30 MG extended release capsule Take 1 capsule by mouth daily for 30 days. 30 capsule 0    [START ON 7/3/2022] amphetamine-dextroamphetamine (ADDERALL XR) 30 MG extended release capsule Take 1 capsule by mouth daily for 30 days. 30 capsule 0    amphetamine-dextroamphetamine (ADDERALL XR) 30 MG extended release capsule Take 1 capsule by mouth daily for 30 days.  30 capsule 0    pantoprazole (PROTONIX) 40 MG tablet Take 1 tablet by mouth daily 90 tablet 3    Multiple Vitamins-Minerals (MULTIVITAMIN ADULT PO) Take by mouth      Cholecalciferol (VITAMIN D3) 125 MCG (5000 UT) TABS Take by mouth      Loratadine (CLARITIN PO) Take by mouth nightly        No current facility-administered medications for this visit. Review of Systems   Psychiatric/Behavioral: Positive for decreased concentration. All other systems reviewed and are negative. OBJECTIVE:  Physical Exam  Constitutional:       Appearance: Normal appearance. HENT:      Head: Normocephalic and atraumatic. Pulmonary:      Effort: Pulmonary effort is normal. No respiratory distress. Skin:     General: Skin is warm and dry. Neurological:      General: No focal deficit present. Mental Status: He is alert and oriented to person, place, and time. Psychiatric:         Mood and Affect: Mood normal.         Behavior: Behavior normal.        /78 (Site: Left Upper Arm, Position: Sitting, Cuff Size: Medium Adult)   Pulse 75   Ht 5' 11\" (1.803 m)   Wt 262 lb (118.8 kg)   SpO2 95%   BMI 36.54 kg/m²      PHQ Scores 2/17/2022 1/19/2021 3/18/2019 9/17/2018 1/6/2017   PHQ2 Score 0 0 0 0 0   PHQ9 Score 0 0 0 0 0     Interpretation of Total Score Depression Severity: 1-4 = Minimal depression, 5-9 = Mild depression, 10-14 = Moderate depression, 15-19 = Moderately severe depression, 20-27 =Severe depression        ASSESSMENT/PLAN:  Pito Torres was seen today for adhd, follow-up and weight loss. Diagnoses and all orders for this visit:    Attention deficit disorder (ADD) without hyperactivity  - Initially had great response to the medication with no side effects. This response seemed to wane after a few weeks. - Options discussed and we have agreed to increase from 20 mg daily to 30 mg daily. -     amphetamine-dextroamphetamine (ADDERALL XR) 30 MG extended release capsule; Take 1 capsule by mouth daily for 30 days. -     amphetamine-dextroamphetamine (ADDERALL XR) 30 MG extended release capsule; Take 1 capsule by mouth daily for 30 days. -     amphetamine-dextroamphetamine (ADDERALL XR) 30 MG extended release capsule; Take 1 capsule by mouth daily for 30 days.     Controlled Substance Monitoring:  Acute and Chronic Pain Monitoring:   RX Monitoring 6/3/2022   Periodic Controlled Substance Monitoring No signs of potential drug abuse or diversion identified.        Follow-up 3m        Brook Woodard, APRN - CNP

## 2022-08-31 ENCOUNTER — TELEMEDICINE (OUTPATIENT)
Dept: INTERNAL MEDICINE CLINIC | Age: 42
End: 2022-08-31
Payer: COMMERCIAL

## 2022-08-31 DIAGNOSIS — F98.8 ATTENTION DEFICIT DISORDER (ADD) WITHOUT HYPERACTIVITY: Primary | ICD-10-CM

## 2022-08-31 PROCEDURE — 99213 OFFICE O/P EST LOW 20 MIN: CPT | Performed by: NURSE PRACTITIONER

## 2022-08-31 RX ORDER — DEXTROAMPHETAMINE SACCHARATE, AMPHETAMINE ASPARTATE MONOHYDRATE, DEXTROAMPHETAMINE SULFATE AND AMPHETAMINE SULFATE 7.5; 7.5; 7.5; 7.5 MG/1; MG/1; MG/1; MG/1
30 CAPSULE, EXTENDED RELEASE ORAL DAILY
Qty: 30 CAPSULE | Refills: 0 | Status: SHIPPED | OUTPATIENT
Start: 2022-10-30 | End: 2022-11-29

## 2022-08-31 RX ORDER — DEXTROAMPHETAMINE SACCHARATE, AMPHETAMINE ASPARTATE MONOHYDRATE, DEXTROAMPHETAMINE SULFATE AND AMPHETAMINE SULFATE 7.5; 7.5; 7.5; 7.5 MG/1; MG/1; MG/1; MG/1
30 CAPSULE, EXTENDED RELEASE ORAL DAILY
Qty: 30 CAPSULE | Refills: 0 | Status: SHIPPED | OUTPATIENT
Start: 2022-09-30 | End: 2022-10-30

## 2022-08-31 RX ORDER — DEXTROAMPHETAMINE SACCHARATE, AMPHETAMINE ASPARTATE MONOHYDRATE, DEXTROAMPHETAMINE SULFATE AND AMPHETAMINE SULFATE 7.5; 7.5; 7.5; 7.5 MG/1; MG/1; MG/1; MG/1
30 CAPSULE, EXTENDED RELEASE ORAL DAILY
Qty: 30 CAPSULE | Refills: 0 | Status: SHIPPED | OUTPATIENT
Start: 2022-08-31 | End: 2022-09-30

## 2022-08-31 NOTE — PROGRESS NOTES
SUBJECTIVE:    Patient ID: Wagner Chu is a 43 y.o. male. CC: Adult ADD    HPI: Patient presents to the office today for follow-up of adult ADD. Patient has been evaluated in the office recently for concerns about ADD. He was given a referral to psychology who felt he did have evidence of adult attention deficit disorder. After discussions regarding this disease, the patient opted for treatment. He was started on Adderall XR 20 mg daily. He had good improvement of his work on this medication but noticed the benefit weaning after few weeks. He was increased to 30 mg daily which she has been taking as directed. Today, the patient feels the medication has continued to help. He denies any side effects. He gives himself a drug holiday some weekends. Past Medical History:   Diagnosis Date    ADHD (attention deficit hyperactivity disorder)     Allergic rhinitis     Dizziness     GERD (gastroesophageal reflux disease)     Headache     Hyperglycemia     JURGEN on CPAP     Recurrent upper respiratory infection (URI)     Sleep apnea     Tinnitus         Current Outpatient Medications   Medication Sig Dispense Refill    [START ON 10/30/2022] amphetamine-dextroamphetamine (ADDERALL XR) 30 MG extended release capsule Take 1 capsule by mouth daily for 30 days. 30 capsule 0    [START ON 9/30/2022] amphetamine-dextroamphetamine (ADDERALL XR) 30 MG extended release capsule Take 1 capsule by mouth daily for 30 days. 30 capsule 0    amphetamine-dextroamphetamine (ADDERALL XR) 30 MG extended release capsule Take 1 capsule by mouth daily for 30 days. 30 capsule 0    pantoprazole (PROTONIX) 40 MG tablet Take 1 tablet by mouth daily 90 tablet 3    Multiple Vitamins-Minerals (MULTIVITAMIN ADULT PO) Take by mouth      Cholecalciferol (VITAMIN D3) 125 MCG (5000 UT) TABS Take by mouth      Loratadine (CLARITIN PO) Take by mouth nightly        No current facility-administered medications for this visit.            Review of Systems   Psychiatric/Behavioral: Positive for decreased concentration. All other systems reviewed and are negative. OBJECTIVE:  Physical Exam  Constitutional:       Appearance: Normal appearance. HENT:      Head: Normocephalic and atraumatic. Pulmonary:      Effort: Pulmonary effort is normal. No respiratory distress. Skin:     General: Skin is warm and dry. Neurological:      General: No focal deficit present. Mental Status: He is alert and oriented to person, place, and time. Psychiatric:         Mood and Affect: Mood normal.         Behavior: Behavior normal.        There were no vitals taken for this visit. PHQ Scores 2/17/2022 1/19/2021 3/18/2019 9/17/2018 1/6/2017   PHQ2 Score 0 0 0 0 0   PHQ9 Score 0 0 0 0 0     Interpretation of Total Score Depression Severity: 1-4 = Minimal depression, 5-9 = Mild depression, 10-14 = Moderate depression, 15-19 = Moderately severe depression, 20-27 =Severe depression        Assessment/Plan:  Diagnoses and all orders for this visit:    Attention deficit disorder (ADD) without hyperactivity  - Chronic, stable  - Continue current regimen    -     amphetamine-dextroamphetamine (ADDERALL XR) 30 MG extended release capsule; Take 1 capsule by mouth daily for 30 days. -     amphetamine-dextroamphetamine (ADDERALL XR) 30 MG extended release capsule; Take 1 capsule by mouth daily for 30 days. -     amphetamine-dextroamphetamine (ADDERALL XR) 30 MG extended release capsule; Take 1 capsule by mouth daily for 30 days. Controlled Substance Monitoring:  Acute and Chronic Pain Monitoring:   RX Monitoring 8/31/2022   Periodic Controlled Substance Monitoring No signs of potential drug abuse or diversion identified.        Follow-up 3m        Veverly OLYA Jones - CNP

## 2022-09-16 ENCOUNTER — OFFICE VISIT (OUTPATIENT)
Dept: INTERNAL MEDICINE CLINIC | Age: 42
End: 2022-09-16
Payer: COMMERCIAL

## 2022-09-16 VITALS
WEIGHT: 254 LBS | BODY MASS INDEX: 35.56 KG/M2 | HEART RATE: 66 BPM | DIASTOLIC BLOOD PRESSURE: 80 MMHG | OXYGEN SATURATION: 98 % | SYSTOLIC BLOOD PRESSURE: 120 MMHG | HEIGHT: 71 IN

## 2022-09-16 DIAGNOSIS — Z00.00 ANNUAL PHYSICAL EXAM: ICD-10-CM

## 2022-09-16 DIAGNOSIS — Z86.010 HX OF COLONIC POLYP: ICD-10-CM

## 2022-09-16 DIAGNOSIS — F90.9 ADULT ADHD: ICD-10-CM

## 2022-09-16 DIAGNOSIS — Z12.11 COLON CANCER SCREENING: ICD-10-CM

## 2022-09-16 DIAGNOSIS — Z00.00 ANNUAL PHYSICAL EXAM: Primary | ICD-10-CM

## 2022-09-16 DIAGNOSIS — K21.9 GASTROESOPHAGEAL REFLUX DISEASE WITHOUT ESOPHAGITIS: ICD-10-CM

## 2022-09-16 DIAGNOSIS — F41.9 ANXIETY: ICD-10-CM

## 2022-09-16 DIAGNOSIS — F33.0 MILD EPISODE OF RECURRENT MAJOR DEPRESSIVE DISORDER (HCC): ICD-10-CM

## 2022-09-16 LAB
A/G RATIO: 2.1 (ref 1.1–2.2)
ALBUMIN SERPL-MCNC: 4.9 G/DL (ref 3.4–5)
ALP BLD-CCNC: 104 U/L (ref 40–129)
ALT SERPL-CCNC: 39 U/L (ref 10–40)
ANION GAP SERPL CALCULATED.3IONS-SCNC: 14 MMOL/L (ref 3–16)
AST SERPL-CCNC: 27 U/L (ref 15–37)
BILIRUB SERPL-MCNC: 0.6 MG/DL (ref 0–1)
BUN BLDV-MCNC: 19 MG/DL (ref 7–20)
CALCIUM SERPL-MCNC: 9.6 MG/DL (ref 8.3–10.6)
CHLORIDE BLD-SCNC: 108 MMOL/L (ref 99–110)
CHOLESTEROL, TOTAL: 194 MG/DL (ref 0–199)
CO2: 21 MMOL/L (ref 21–32)
CREAT SERPL-MCNC: 1 MG/DL (ref 0.9–1.3)
GFR AFRICAN AMERICAN: >60
GFR NON-AFRICAN AMERICAN: >60
GLUCOSE BLD-MCNC: 112 MG/DL (ref 70–99)
HDLC SERPL-MCNC: 33 MG/DL (ref 40–60)
LDL CHOLESTEROL CALCULATED: 108 MG/DL
POTASSIUM SERPL-SCNC: 4.1 MMOL/L (ref 3.5–5.1)
SODIUM BLD-SCNC: 143 MMOL/L (ref 136–145)
TOTAL PROTEIN: 7.2 G/DL (ref 6.4–8.2)
TRIGL SERPL-MCNC: 267 MG/DL (ref 0–150)
VLDLC SERPL CALC-MCNC: 53 MG/DL

## 2022-09-16 PROCEDURE — 99396 PREV VISIT EST AGE 40-64: CPT | Performed by: NURSE PRACTITIONER

## 2022-09-16 ASSESSMENT — ENCOUNTER SYMPTOMS
RESPIRATORY NEGATIVE: 1
GASTROINTESTINAL NEGATIVE: 1

## 2022-09-16 NOTE — PROGRESS NOTES
SUBJECTIVE:    Patient ID: Wagner Chu is a 43 y.o. male. CC: Annual physical, dyslipidemia, GERD    HPI: Patient presents to the office today for annual physical examination and follow-up of chronic medical conditions. He has a history of dyslipidemia. He denies any chest pain or shortness of breath. He has a history of GERD. He is compliant with PPI. Patient has a history of sleep apnea. He has had colon polyps when he was younger. This resulted in early colonoscopy. He had to cancel last colonoscopy due to being COVID +\. Past Medical History:   Diagnosis Date    ADHD (attention deficit hyperactivity disorder)     Allergic rhinitis     Dizziness     GERD (gastroesophageal reflux disease)     Headache     Hyperglycemia     JURGEN on CPAP     Recurrent upper respiratory infection (URI)     Sleep apnea     Tinnitus         Current Outpatient Medications   Medication Sig Dispense Refill    [START ON 10/30/2022] amphetamine-dextroamphetamine (ADDERALL XR) 30 MG extended release capsule Take 1 capsule by mouth daily for 30 days. 30 capsule 0    [START ON 9/30/2022] amphetamine-dextroamphetamine (ADDERALL XR) 30 MG extended release capsule Take 1 capsule by mouth daily for 30 days. 30 capsule 0    amphetamine-dextroamphetamine (ADDERALL XR) 30 MG extended release capsule Take 1 capsule by mouth daily for 30 days. 30 capsule 0    pantoprazole (PROTONIX) 40 MG tablet Take 1 tablet by mouth daily 90 tablet 3    Multiple Vitamins-Minerals (MULTIVITAMIN ADULT PO) Take by mouth      Cholecalciferol (VITAMIN D3) 125 MCG (5000 UT) TABS Take by mouth      Loratadine (CLARITIN PO) Take by mouth nightly        No current facility-administered medications for this visit. Review of Systems   Constitutional: Negative. Respiratory: Negative. Cardiovascular: Negative. Gastrointestinal: Negative. Genitourinary: Negative. Musculoskeletal: Negative. Neurological: Negative. regimen    Adult ADHD  - Chronic, stable  - Continue current regimen    Gastroesophageal reflux disease without esophagitis  - Chronic, stable  - Continue current regimen    Hx of colonic polyp  -     AFL - Dionte Live MD, Gastroenterology, [facility]    Colon cancer screening  -     AFL - Dionte Live MD, Gastroenterology, [facility]        Syeda Peoples, APRN - CNP

## 2022-09-17 LAB
ESTIMATED AVERAGE GLUCOSE: 119.8 MG/DL
HBA1C MFR BLD: 5.8 %

## 2023-01-01 NOTE — PROGRESS NOTES
4 Eyes Admission Assessment     I agree as the admission nurse that 2 RN's have performed a thorough Head to Toe Skin Assessment on the patient. ALL assessment sites listed below have been assessed on admission. Areas assessed by both nurses: YES  [x]   Head, Face, and Ears   [x]   Shoulders, Back, and Chest  [x]   Arms, Elbows, and Hands   [x]   Coccyx, Sacrum, and Ischium  [x]   Legs, Feet, and Heels        Does the Patient have Skin Breakdown?   No         Conor Prevention initiated:  No   Wound Care Orders initiated:  No      [facility] nurse consulted for Pressure Injury (Stage 3,4, Unstageable, DTI, NWPT, and Complex wounds) or Conor score 18 or lower:  No      Nurse 1 eSignature: Electronically signed by Jeniffer Purdy RN on 1/28/21 at 1:40 AM EST    **SHARE this note so that the co-signing nurse is able to place an eSignature**    Nurse 2 eSignature: Electronically signed by Gabbi Xiong RN on 1/28/21 at 4:51 AM EST NASAL [employer]STION

## 2023-03-16 ENCOUNTER — TELEMEDICINE (OUTPATIENT)
Dept: INTERNAL MEDICINE CLINIC | Age: 43
End: 2023-03-16
Payer: COMMERCIAL

## 2023-03-16 DIAGNOSIS — F33.0 MILD EPISODE OF RECURRENT MAJOR DEPRESSIVE DISORDER (HCC): Primary | ICD-10-CM

## 2023-03-16 DIAGNOSIS — D35.2 PITUITARY MACROADENOMA (HCC): ICD-10-CM

## 2023-03-16 DIAGNOSIS — F98.8 ATTENTION DEFICIT DISORDER (ADD) WITHOUT HYPERACTIVITY: ICD-10-CM

## 2023-03-16 PROCEDURE — 99213 OFFICE O/P EST LOW 20 MIN: CPT | Performed by: NURSE PRACTITIONER

## 2023-03-16 RX ORDER — DEXTROAMPHETAMINE SACCHARATE, AMPHETAMINE ASPARTATE MONOHYDRATE, DEXTROAMPHETAMINE SULFATE AND AMPHETAMINE SULFATE 7.5; 7.5; 7.5; 7.5 MG/1; MG/1; MG/1; MG/1
30 CAPSULE, EXTENDED RELEASE ORAL DAILY
Qty: 30 CAPSULE | Refills: 0 | Status: SHIPPED | OUTPATIENT
Start: 2023-04-15 | End: 2023-05-15

## 2023-03-16 RX ORDER — DEXTROAMPHETAMINE SACCHARATE, AMPHETAMINE ASPARTATE MONOHYDRATE, DEXTROAMPHETAMINE SULFATE AND AMPHETAMINE SULFATE 7.5; 7.5; 7.5; 7.5 MG/1; MG/1; MG/1; MG/1
30 CAPSULE, EXTENDED RELEASE ORAL DAILY
Qty: 30 CAPSULE | Refills: 0 | Status: SHIPPED | OUTPATIENT
Start: 2023-03-16 | End: 2023-04-15

## 2023-03-16 RX ORDER — DEXTROAMPHETAMINE SACCHARATE, AMPHETAMINE ASPARTATE MONOHYDRATE, DEXTROAMPHETAMINE SULFATE AND AMPHETAMINE SULFATE 7.5; 7.5; 7.5; 7.5 MG/1; MG/1; MG/1; MG/1
30 CAPSULE, EXTENDED RELEASE ORAL DAILY
Qty: 30 CAPSULE | Refills: 0 | Status: SHIPPED | OUTPATIENT
Start: 2023-05-15 | End: 2023-06-14

## 2023-03-16 ASSESSMENT — PATIENT HEALTH QUESTIONNAIRE - PHQ9
SUM OF ALL RESPONSES TO PHQ9 QUESTIONS 1 & 2: 0
3. TROUBLE FALLING OR STAYING ASLEEP: 0
SUM OF ALL RESPONSES TO PHQ QUESTIONS 1-9: 0
5. POOR APPETITE OR OVEREATING: 0
6. FEELING BAD ABOUT YOURSELF - OR THAT YOU ARE A FAILURE OR HAVE LET YOURSELF OR YOUR FAMILY DOWN: 0
10. IF YOU CHECKED OFF ANY PROBLEMS, HOW DIFFICULT HAVE THESE PROBLEMS MADE IT FOR YOU TO DO YOUR WORK, TAKE CARE OF THINGS AT HOME, OR GET ALONG WITH OTHER PEOPLE: 0
9. THOUGHTS THAT YOU WOULD BE BETTER OFF DEAD, OR OF HURTING YOURSELF: 0
2. FEELING DOWN, DEPRESSED OR HOPELESS: 0
7. TROUBLE CONCENTRATING ON THINGS, SUCH AS READING THE NEWSPAPER OR WATCHING TELEVISION: 0
4. FEELING TIRED OR HAVING LITTLE ENERGY: 0
8. MOVING OR SPEAKING SO SLOWLY THAT OTHER PEOPLE COULD HAVE NOTICED. OR THE OPPOSITE, BEING SO FIGETY OR RESTLESS THAT YOU HAVE BEEN MOVING AROUND A LOT MORE THAN USUAL: 0
SUM OF ALL RESPONSES TO PHQ QUESTIONS 1-9: 0
1. LITTLE INTEREST OR PLEASURE IN DOING THINGS: 0

## 2023-03-16 NOTE — PROGRESS NOTES
SUBJECTIVE:    Patient ID: Ling Tobias is a 37 y.o. male. CC: Adult ADD    HPI: Patient presents to the office today for follow-up of adult ADD. Patient has been evaluated in the office recently for concerns about ADD. He was given a referral to psychology who felt he did have evidence of adult attention deficit disorder. After discussions regarding this disease, the patient opted for treatment. He was started on Adderall XR 20 mg daily later increased to 30 mg daily. He has been taking this as directed. He denies any side effects. He feels the medication has helped him with work. He will give himself drug holidays over some weekends. Past Medical History:   Diagnosis Date    ADHD (attention deficit hyperactivity disorder)     Allergic rhinitis     Dizziness     GERD (gastroesophageal reflux disease)     Headache     Hyperglycemia     JURGEN on CPAP     Recurrent upper respiratory infection (URI)     Sleep apnea     Tinnitus         Current Outpatient Medications   Medication Sig Dispense Refill    [START ON 5/15/2023] amphetamine-dextroamphetamine (ADDERALL XR) 30 MG extended release capsule Take 1 capsule by mouth daily for 30 days. 30 capsule 0    [START ON 4/15/2023] amphetamine-dextroamphetamine (ADDERALL XR) 30 MG extended release capsule Take 1 capsule by mouth daily for 30 days. 30 capsule 0    amphetamine-dextroamphetamine (ADDERALL XR) 30 MG extended release capsule Take 1 capsule by mouth daily for 30 days. 30 capsule 0    pantoprazole (PROTONIX) 40 MG tablet Take 1 tablet by mouth daily 90 tablet 3    Multiple Vitamins-Minerals (MULTIVITAMIN ADULT PO) Take by mouth      Cholecalciferol (VITAMIN D3) 125 MCG (5000 UT) TABS Take by mouth      Loratadine (CLARITIN PO) Take by mouth nightly        No current facility-administered medications for this visit. Review of Systems   Psychiatric/Behavioral:  Positive for decreased concentration.     All other systems reviewed and are negative.      OBJECTIVE:  Physical Exam  Constitutional:       Appearance: Normal appearance.   HENT:      Head: Normocephalic and atraumatic.   Pulmonary:      Effort: Pulmonary effort is normal. No respiratory distress.   Skin:     General: Skin is warm and dry.   Neurological:      General: No focal deficit present.      Mental Status: He is alert and oriented to person, place, and time.   Psychiatric:         Mood and Affect: Mood normal.         Behavior: Behavior normal.      There were no vitals taken for this visit.     PHQ Scores 3/16/2023 2/17/2022 1/19/2021 3/18/2019 9/17/2018 1/6/2017   PHQ2 Score 0 0 0 0 0 0   PHQ9 Score 0 0 0 0 0 0     Interpretation of Total Score Depression Severity: 1-4 = Minimal depression, 5-9 = Mild depression, 10-14 = Moderate depression, 15-19 = Moderately severe depression, 20-27 =Severe depression        Assessment/Plan:  Diagnoses and all orders for this visit:    Attention deficit disorder (ADD) without hyperactivity  - Chronic, stable  - Continue current regimen    -     amphetamine-dextroamphetamine (ADDERALL XR) 30 MG extended release capsule; Take 1 capsule by mouth daily for 30 days.  -     amphetamine-dextroamphetamine (ADDERALL XR) 30 MG extended release capsule; Take 1 capsule by mouth daily for 30 days.  -     amphetamine-dextroamphetamine (ADDERALL XR) 30 MG extended release capsule; Take 1 capsule by mouth daily for 30 days.    Controlled Substance Monitoring:  Acute and Chronic Pain Monitoring:   RX Monitoring 3/16/2023   Periodic Controlled Substance Monitoring No signs of potential drug abuse or diversion identified.     Follow-up 3m        Elio Junior, APRN - CNP

## 2023-03-24 DIAGNOSIS — K21.9 HIATAL HERNIA WITH GERD: ICD-10-CM

## 2023-03-24 DIAGNOSIS — K44.9 HIATAL HERNIA WITH GERD: ICD-10-CM

## 2023-03-24 RX ORDER — PANTOPRAZOLE SODIUM 40 MG/1
TABLET, DELAYED RELEASE ORAL
Qty: 90 TABLET | Refills: 1 | Status: SHIPPED | OUTPATIENT
Start: 2023-03-24

## 2023-09-19 ENCOUNTER — OFFICE VISIT (OUTPATIENT)
Dept: INTERNAL MEDICINE CLINIC | Age: 43
End: 2023-09-19
Payer: COMMERCIAL

## 2023-09-19 VITALS
HEIGHT: 71 IN | HEART RATE: 72 BPM | DIASTOLIC BLOOD PRESSURE: 84 MMHG | OXYGEN SATURATION: 97 % | WEIGHT: 270 LBS | BODY MASS INDEX: 37.8 KG/M2 | SYSTOLIC BLOOD PRESSURE: 122 MMHG

## 2023-09-19 DIAGNOSIS — K44.9 HIATAL HERNIA WITH GERD: ICD-10-CM

## 2023-09-19 DIAGNOSIS — Z00.00 ANNUAL PHYSICAL EXAM: ICD-10-CM

## 2023-09-19 DIAGNOSIS — Z00.00 ANNUAL PHYSICAL EXAM: Primary | ICD-10-CM

## 2023-09-19 DIAGNOSIS — E78.1 PURE HYPERTRIGLYCERIDEMIA: ICD-10-CM

## 2023-09-19 DIAGNOSIS — Z12.11 COLON CANCER SCREENING: ICD-10-CM

## 2023-09-19 DIAGNOSIS — F98.8 ATTENTION DEFICIT DISORDER (ADD) WITHOUT HYPERACTIVITY: ICD-10-CM

## 2023-09-19 DIAGNOSIS — K21.9 HIATAL HERNIA WITH GERD: ICD-10-CM

## 2023-09-19 DIAGNOSIS — F41.9 ANXIETY: ICD-10-CM

## 2023-09-19 DIAGNOSIS — Z23 NEEDS FLU SHOT: ICD-10-CM

## 2023-09-19 PROCEDURE — 99396 PREV VISIT EST AGE 40-64: CPT | Performed by: NURSE PRACTITIONER

## 2023-09-19 PROCEDURE — 90674 CCIIV4 VAC NO PRSV 0.5 ML IM: CPT | Performed by: NURSE PRACTITIONER

## 2023-09-19 PROCEDURE — 90471 IMMUNIZATION ADMIN: CPT | Performed by: NURSE PRACTITIONER

## 2023-09-19 RX ORDER — PANTOPRAZOLE SODIUM 40 MG/1
40 TABLET, DELAYED RELEASE ORAL DAILY
Qty: 90 TABLET | Refills: 3 | Status: SHIPPED | OUTPATIENT
Start: 2023-09-19

## 2023-09-19 RX ORDER — DEXTROAMPHETAMINE SACCHARATE, AMPHETAMINE ASPARTATE MONOHYDRATE, DEXTROAMPHETAMINE SULFATE AND AMPHETAMINE SULFATE 7.5; 7.5; 7.5; 7.5 MG/1; MG/1; MG/1; MG/1
30 CAPSULE, EXTENDED RELEASE ORAL DAILY
Qty: 30 CAPSULE | Refills: 0 | Status: SHIPPED | OUTPATIENT
Start: 2023-09-19 | End: 2023-10-19

## 2023-09-19 RX ORDER — DEXTROAMPHETAMINE SACCHARATE, AMPHETAMINE ASPARTATE MONOHYDRATE, DEXTROAMPHETAMINE SULFATE AND AMPHETAMINE SULFATE 7.5; 7.5; 7.5; 7.5 MG/1; MG/1; MG/1; MG/1
30 CAPSULE, EXTENDED RELEASE ORAL DAILY
Qty: 30 CAPSULE | Refills: 0 | Status: SHIPPED | OUTPATIENT
Start: 2023-10-19 | End: 2023-11-18

## 2023-09-19 RX ORDER — DEXTROAMPHETAMINE SACCHARATE, AMPHETAMINE ASPARTATE MONOHYDRATE, DEXTROAMPHETAMINE SULFATE AND AMPHETAMINE SULFATE 7.5; 7.5; 7.5; 7.5 MG/1; MG/1; MG/1; MG/1
30 CAPSULE, EXTENDED RELEASE ORAL DAILY
Qty: 30 CAPSULE | Refills: 0 | Status: SHIPPED | OUTPATIENT
Start: 2023-11-18 | End: 2023-12-18

## 2023-09-19 ASSESSMENT — ENCOUNTER SYMPTOMS
RESPIRATORY NEGATIVE: 1
GASTROINTESTINAL NEGATIVE: 1

## 2023-09-20 LAB
ALBUMIN SERPL-MCNC: 4.7 G/DL (ref 3.4–5)
ALBUMIN/GLOB SERPL: 2 {RATIO} (ref 1.1–2.2)
ALP SERPL-CCNC: 101 U/L (ref 40–129)
ALT SERPL-CCNC: 47 U/L (ref 10–40)
ANION GAP SERPL CALCULATED.3IONS-SCNC: 11 MMOL/L (ref 3–16)
AST SERPL-CCNC: 28 U/L (ref 15–37)
BASOPHILS # BLD: 0 K/UL (ref 0–0.2)
BASOPHILS NFR BLD: 0.6 %
BILIRUB SERPL-MCNC: 0.3 MG/DL (ref 0–1)
BUN SERPL-MCNC: 21 MG/DL (ref 7–20)
CALCIUM SERPL-MCNC: 9.3 MG/DL (ref 8.3–10.6)
CHLORIDE SERPL-SCNC: 106 MMOL/L (ref 99–110)
CHOLEST SERPL-MCNC: 178 MG/DL (ref 0–199)
CO2 SERPL-SCNC: 24 MMOL/L (ref 21–32)
CREAT SERPL-MCNC: 1 MG/DL (ref 0.9–1.3)
DEPRECATED RDW RBC AUTO: 13.9 % (ref 12.4–15.4)
EOSINOPHIL # BLD: 0.1 K/UL (ref 0–0.6)
EOSINOPHIL NFR BLD: 2.4 %
EST. AVERAGE GLUCOSE BLD GHB EST-MCNC: 131.2 MG/DL
GFR SERPLBLD CREATININE-BSD FMLA CKD-EPI: >60 ML/MIN/{1.73_M2}
GLUCOSE SERPL-MCNC: 114 MG/DL (ref 70–99)
HBA1C MFR BLD: 6.2 %
HCT VFR BLD AUTO: 43.4 % (ref 40.5–52.5)
HDLC SERPL-MCNC: 32 MG/DL (ref 40–60)
HGB BLD-MCNC: 14.5 G/DL (ref 13.5–17.5)
LDLC SERPL CALC-MCNC: ABNORMAL MG/DL
LDLC SERPL-MCNC: 95 MG/DL
LYMPHOCYTES # BLD: 1.7 K/UL (ref 1–5.1)
LYMPHOCYTES NFR BLD: 29.1 %
MCH RBC QN AUTO: 27.9 PG (ref 26–34)
MCHC RBC AUTO-ENTMCNC: 33.5 G/DL (ref 31–36)
MCV RBC AUTO: 83.2 FL (ref 80–100)
MONOCYTES # BLD: 0.3 K/UL (ref 0–1.3)
MONOCYTES NFR BLD: 5.7 %
NEUTROPHILS # BLD: 3.5 K/UL (ref 1.7–7.7)
NEUTROPHILS NFR BLD: 62.2 %
PLATELET # BLD AUTO: 301 K/UL (ref 135–450)
PMV BLD AUTO: 8 FL (ref 5–10.5)
POTASSIUM SERPL-SCNC: 4.2 MMOL/L (ref 3.5–5.1)
PROT SERPL-MCNC: 7.1 G/DL (ref 6.4–8.2)
RBC # BLD AUTO: 5.21 M/UL (ref 4.2–5.9)
SODIUM SERPL-SCNC: 141 MMOL/L (ref 136–145)
TRIGL SERPL-MCNC: 307 MG/DL (ref 0–150)
VLDLC SERPL CALC-MCNC: ABNORMAL MG/DL
WBC # BLD AUTO: 5.7 K/UL (ref 4–11)

## 2023-09-23 LAB
6MAM UR QL: NOT DETECTED
7AMINOCLONAZEPAM UR QL: NOT DETECTED
A-OH ALPRAZ UR QL: NOT DETECTED
ALPHA-OH-MIDAZOLAM, URINE: NOT DETECTED
ALPRAZ UR QL: NOT DETECTED
AMPHET UR QL SCN: NOT DETECTED
ANNOTATION COMMENT IMP: NORMAL
ANNOTATION COMMENT IMP: NORMAL
BARBITURATES UR QL: NOT DETECTED
BUPRENORPHINE UR QL: NOT DETECTED
BZE UR QL: NOT DETECTED
CARBOXYTHC UR QL: NOT DETECTED
CARISOPRODOL UR QL: NOT DETECTED
CLONAZEPAM UR QL: NOT DETECTED
CODEINE UR QL: NOT DETECTED
CREAT UR-MCNC: 279.3 MG/DL (ref 20–400)
DIAZEPAM UR QL: NOT DETECTED
ETHYL GLUCURONIDE UR QL: NOT DETECTED
FENTANYL UR QL: NOT DETECTED
GABAPENTIN: NOT DETECTED
HYDROCODONE UR QL: NOT DETECTED
HYDROMORPHONE UR QL: NOT DETECTED
LORAZEPAM UR QL: NOT DETECTED
MDA UR QL: NOT DETECTED
MDEA UR QL: NOT DETECTED
MDMA UR QL: NOT DETECTED
MEPERIDINE UR QL: NOT DETECTED
METHADONE UR QL: NOT DETECTED
METHAMPHET UR QL: NOT DETECTED
MIDAZOLAM UR QL SCN: NOT DETECTED
MORPHINE UR QL: NOT DETECTED
NALOXONE: NOT DETECTED
NORBUPRENORPHINE UR QL CFM: NOT DETECTED
NORDIAZEPAM UR QL: NOT DETECTED
NORFENTANYL UR QL: NOT DETECTED
NORHYDROCODONE UR QL CFM: NOT DETECTED
NOROXYCODONE UR QL CFM: NOT DETECTED
NOROXYMORPHONE, URINE: NOT DETECTED
OXAZEPAM UR QL: NOT DETECTED
OXYCODONE UR QL: NOT DETECTED
OXYMORPHONE UR QL: NOT DETECTED
PATHOLOGY STUDY: NORMAL
PCP UR QL: NOT DETECTED
PHENTERMINE UR QL: NOT DETECTED
PPAA UR QL: NOT DETECTED
PREGABALIN: NOT DETECTED
SERVICE CMNT-IMP: NORMAL
TAPENTADOL UR QL SCN: NOT DETECTED
TAPENTADOL-O-SULFATE, URINE: NOT DETECTED
TEMAZEPAM UR QL: NOT DETECTED
TRAMADOL UR QL: NOT DETECTED
ZOLPIDEM UR QL: NOT DETECTED

## 2023-12-20 NOTE — PROGRESS NOTES
[facility] PRE-OPERATIVE INSTRUCTIONS    Day of Procedure: 1/3/23               Arrival time: 0930               Surgery time:11:00    Take the following medications with a sip of water:  Follow your MD/Surgeons pre-procedure instructions regarding your medications     Do not eat or drink anything after 12:00 midnight prior to your surgery.  This includes water chewing gum, mints and ice chips.   You may brush your teeth and gargle the morning of your surgery, but do not swallow the water     Please see your family doctor/pediatrician for a history and physical and/or concerning medications.   Bring any test results/reports from your physicians office.   If you are under the care of a heart doctor or specialist doctor, please be aware that you may be asked to them for clearance    You may be asked to stop blood thinners such as Coumadin, Plavix, Fragmin, Lovenox, etc., or any anti-inflammatories such as:  Aspirin, Ibuprofen, Advil, Naproxen prior to your surgery.    We also ask that you stop any OTC medications such as fish oil, vitamin E, glucosamine, garlic, Multivitamins, COQ 10, etc. MAY TAKE TYLENOL    We ask that you do not smoke 24 hours prior to surgery  We ask that you do not  drink any alcoholic beverages 24 hours prior to surgery     You must make arrangements for a responsible adult to take you home after your surgery.    For your safety you will not be allowed to leave alone or drive yourself home.  Your surgery will be cancelled if you do not have a ride home.     Also for your safety, it is strongly suggested that someone stay with you the first 24 hours after your surgery.     A parent or legal guardian must accompany a child scheduled for surgery and plan to stay at the hospital until the child is discharged.    Please do not bring other children with you.    For your comfort, please wear simple loose fitting clothing to the hospital.  Please do not bring valuables.    Do not wear any

## 2023-12-20 NOTE — PROGRESS NOTES
[facility] Pre-Admission Testing Electronic Communication Worksheet for OR/ENDO Procedures        Patient: Oscar Conde    DOS: 1/3/24    Arrival Time: 9:30AM    Surgery Time: 11AM    Meds to Bed:  [] YES    [x]  NO    Transportation Confirmed: [x] YES    []  NO WIFE GREG    History and Physical:  [] YES    []  NO  [x] N/A  If yes, please list doctor or Urgent Care and date of H&P: RAFFI ORDONEZ    Additional Clearance(Cardiac, Pulmonary, etc):  [] YES    [x]  NO    Pre-Admission Testing Visit:  [] YES    [x]  NO If no, do labs/testing need to be done DOS?  [] YES    [x]  NO    Medication Reconciliation Complete:  [x] YES    []  NO        Additional Notes:    COLON POLYPS    USES C-PAP          Interview Complete: [x] YES    []  NO          Delisa Gonzalez, RN  10:59 AM

## 2024-01-02 ENCOUNTER — ANESTHESIA EVENT (OUTPATIENT)
Dept: ENDOSCOPY | Age: 44
End: 2024-01-02
Payer: COMMERCIAL

## 2024-01-03 ENCOUNTER — HOSPITAL ENCOUNTER (OUTPATIENT)
Age: 44
Setting detail: OUTPATIENT SURGERY
Discharge: HOME | End: 2024-01-03
Attending: INTERNAL MEDICINE | Admitting: INTERNAL MEDICINE
Payer: COMMERCIAL

## 2024-01-03 ENCOUNTER — ANESTHESIA (OUTPATIENT)
Dept: ENDOSCOPY | Age: 44
End: 2024-01-03
Payer: COMMERCIAL

## 2024-01-03 VITALS
TEMPERATURE: 97.1 F | OXYGEN SATURATION: 96 % | HEIGHT: 71 IN | WEIGHT: 270 LBS | BODY MASS INDEX: 37.8 KG/M2 | SYSTOLIC BLOOD PRESSURE: 116 MMHG | HEART RATE: 62 BPM | DIASTOLIC BLOOD PRESSURE: 83 MMHG | RESPIRATION RATE: 20 BRPM

## 2024-01-03 DIAGNOSIS — Z86.010 PERSONAL HISTORY OF COLONIC POLYPS: ICD-10-CM

## 2024-01-03 PROCEDURE — 88305 TISSUE EXAM BY PATHOLOGIST: CPT

## 2024-01-03 PROCEDURE — 3700000001 HC ADD 15 MINUTES (ANESTHESIA): Performed by: INTERNAL MEDICINE

## 2024-01-03 PROCEDURE — 7100000001 HC PACU RECOVERY - ADDTL 15 MIN: Performed by: INTERNAL MEDICINE

## 2024-01-03 PROCEDURE — 7100000011 HC PHASE II RECOVERY - ADDTL 15 MIN: Performed by: INTERNAL MEDICINE

## 2024-01-03 PROCEDURE — 3700000000 HC ANESTHESIA ATTENDED CARE: Performed by: INTERNAL MEDICINE

## 2024-01-03 PROCEDURE — 6360000002 HC RX W HCPCS

## 2024-01-03 PROCEDURE — 7100000010 HC PHASE II RECOVERY - FIRST 15 MIN: Performed by: INTERNAL MEDICINE

## 2024-01-03 PROCEDURE — 2500000003 HC RX 250 WO HCPCS

## 2024-01-03 PROCEDURE — 7100000000 HC PACU RECOVERY - FIRST 15 MIN: Performed by: INTERNAL MEDICINE

## 2024-01-03 PROCEDURE — 2580000003 HC RX 258: Performed by: ANESTHESIOLOGY

## 2024-01-03 PROCEDURE — 2709999900 HC NON-CHARGEABLE SUPPLY: Performed by: INTERNAL MEDICINE

## 2024-01-03 PROCEDURE — 3609010600 HC COLONOSCOPY POLYPECTOMY SNARE/COLD BIOPSY: Performed by: INTERNAL MEDICINE

## 2024-01-03 RX ORDER — ONDANSETRON 2 MG/ML
4 INJECTION INTRAMUSCULAR; INTRAVENOUS
Status: DISCONTINUED | OUTPATIENT
Start: 2024-01-03 | End: 2024-01-03 | Stop reason: HOSPADM

## 2024-01-03 RX ORDER — LIDOCAINE HYDROCHLORIDE 20 MG/ML
INJECTION, SOLUTION EPIDURAL; INFILTRATION; INTRACAUDAL; PERINEURAL PRN
Status: DISCONTINUED | OUTPATIENT
Start: 2024-01-03 | End: 2024-01-03 | Stop reason: SDUPTHER

## 2024-01-03 RX ORDER — SODIUM CHLORIDE 0.9 % (FLUSH) 0.9 %
5-40 SYRINGE (ML) INJECTION PRN
Status: DISCONTINUED | OUTPATIENT
Start: 2024-01-03 | End: 2024-01-03 | Stop reason: HOSPADM

## 2024-01-03 RX ORDER — SODIUM CHLORIDE 9 MG/ML
INJECTION, SOLUTION INTRAVENOUS PRN
Status: DISCONTINUED | OUTPATIENT
Start: 2024-01-03 | End: 2024-01-03 | Stop reason: HOSPADM

## 2024-01-03 RX ORDER — PROPOFOL 10 MG/ML
INJECTION, EMULSION INTRAVENOUS PRN
Status: DISCONTINUED | OUTPATIENT
Start: 2024-01-03 | End: 2024-01-03 | Stop reason: SDUPTHER

## 2024-01-03 RX ORDER — SODIUM CHLORIDE 0.9 % (FLUSH) 0.9 %
5-40 SYRINGE (ML) INJECTION EVERY 12 HOURS SCHEDULED
Status: DISCONTINUED | OUTPATIENT
Start: 2024-01-03 | End: 2024-01-03 | Stop reason: HOSPADM

## 2024-01-03 RX ADMIN — PROPOFOL 100 MG: 10 INJECTION, EMULSION INTRAVENOUS at 10:46

## 2024-01-03 RX ADMIN — PROPOFOL 180 MCG/KG/MIN: 10 INJECTION, EMULSION INTRAVENOUS at 10:47

## 2024-01-03 RX ADMIN — PROPOFOL 30 MG: 10 INJECTION, EMULSION INTRAVENOUS at 10:48

## 2024-01-03 RX ADMIN — SODIUM CHLORIDE: 9 INJECTION, SOLUTION INTRAVENOUS at 09:56

## 2024-01-03 RX ADMIN — LIDOCAINE HYDROCHLORIDE 60 MG: 20 INJECTION, SOLUTION EPIDURAL; INFILTRATION; INTRACAUDAL; PERINEURAL at 10:46

## 2024-01-03 ASSESSMENT — PAIN SCALES - GENERAL
PAINLEVEL_OUTOF10: 0
PAINLEVEL_OUTOF10: 0

## 2024-01-03 ASSESSMENT — PAIN - FUNCTIONAL ASSESSMENT
PAIN_FUNCTIONAL_ASSESSMENT: NONE - DENIES PAIN
PAIN_FUNCTIONAL_ASSESSMENT: 0-10

## 2024-01-03 ASSESSMENT — ENCOUNTER SYMPTOMS: SHORTNESS OF BREATH: 0

## 2024-01-03 NOTE — PROGRESS NOTES
Discussed D/C instructions with family member/friend/escort all belongings with pt see mar see flow sheet

## 2024-01-03 NOTE — ANESTHESIA POSTPROCEDURE EVALUATION
Department of Anesthesiology  Postprocedure Note    Patient: Oscar Conde  MRN: [id number]  YOB: 1980  Date of evaluation: 1/3/2024    Procedure Summary       Date: 01/03/24 Room / Location: [address]    Anesthesia Start: 1043 Anesthesia Stop: 1127    Procedure: COLONOSCOPY POLYPECTOMY SNARE/COLD BIOPSY Diagnosis:       Personal history of colonic polyps      (Personal history of colonic polyps [Z86.010])    Surgeons: Richard Hooper MD Responsible Provider: Davie Bolanos MD    Anesthesia Type: MAC ASA Status: 2            Anesthesia Type: MAC    Mayco Phase I: Mayco Score: 10    Mayco Phase II: Mayco Score: 10    Anesthesia Post Evaluation    Patient location during evaluation: bedside  Patient participation: complete - patient participated  Level of consciousness: awake and alert  Pain score: 0  Nausea & Vomiting: no nausea  Cardiovascular status: hemodynamically stable  Respiratory status: acceptable  Hydration status: stable  Pain management: adequate    No notable events documented.

## 2024-01-03 NOTE — PROCEDURES
patient tolerated the procedure well, without undue discomfort, hypotension or desaturation. The patient was adequately recovered in the endoscopy suite and was discharged to home.    The preparation was good.       A careful inspection was made as the colonoscope was withdrawn.  This did include a retroflexed evaluation of the rectum.  Findings and interventions are described below. Appropriate photo documentation was obtained.      The scope was then withdrawn back through the cecum, ascending, transverse, descending, sigmoid colon, and rectum.  Careful circumferential examination of the mucosa in these areas demonstrated:    Cecum: normal    Ascending Colon: normal    Transverse Colon: normal    Descending Colon: normal    Sigmoid Colon: old ink tattoo but no residual or recurrent polyp.     Rectum:  3 mm sessile polyp removed by cold snare polypectomy    The scope was then withdrawn into the rectum and retroflexed.  The retroflexed view of the anal verge and rectum demonstrates normal appearance.      The scope was straightened, the colon was decompressed and the scope was withdrawn from the patient.  The patient tolerated the procedure well and was taken to the PACU in good condition.    Estimated blood loss: None    ID Type Source Tests Collected by Time Destination   A : Rectal polyp Tissue Colon SURGICAL PATHOLOGY Richard Hooper MD 1/3/2024 1051          Impression:  sigmoid colon old ink tattoo.  3 mm rectal polyp removed by cold snare polypectomy. Moderate external hemorrhoids.     Recommendations:    1) check patient portal 14 days for biopsy results  2) repeat colonoscopy 3 years for significant history polyps.   3) benefiber 1 tbsp daily    Richard Hooper MD  [facility]  1/3/2024  [phone]

## 2024-01-03 NOTE — ANESTHESIA PRE PROCEDURE
time: 01/03/24 0951  BP Readings from Last 3 Encounters:   01/03/24 112/78   09/19/23 122/84   09/16/22 120/80       BMI  Body mass index is 37.66 kg/m².  Estimated body mass index is 37.66 kg/m² as calculated from the following:    Height as of this encounter: 1.803 m (5' 11\").    Weight as of this encounter: 122.5 kg (270 lb).    CBC   Lab Results   Component Value Date/Time    WBC 5.7 09/19/2023 02:20 PM    RBC 5.21 09/19/2023 02:20 PM    HGB 14.5 09/19/2023 02:20 PM    HCT 43.4 09/19/2023 02:20 PM    MCV 83.2 09/19/2023 02:20 PM    RDW 13.9 09/19/2023 02:20 PM     09/19/2023 02:20 PM     CMP    Lab Results   Component Value Date/Time     09/19/2023 02:20 PM    K 4.2 09/19/2023 02:20 PM    K 3.9 01/29/2021 06:08 AM     09/19/2023 02:20 PM    CO2 24 09/19/2023 02:20 PM    BUN 21 09/19/2023 02:20 PM    CREATININE 1.0 09/19/2023 02:20 PM    GFRAA >60 09/16/2022 10:52 AM    GFRAA >60 01/24/2011 11:40 AM    AGRATIO 2.0 09/19/2023 02:20 PM    LABGLOM >60 09/19/2023 02:20 PM    GLUCOSE 114 09/19/2023 02:20 PM    PROT 7.1 09/19/2023 02:20 PM    PROT 7.0 01/24/2011 11:40 AM    CALCIUM 9.3 09/19/2023 02:20 PM    BILITOT 0.3 09/19/2023 02:20 PM    ALKPHOS 101 09/19/2023 02:20 PM    AST 28 09/19/2023 02:20 PM    ALT 47 09/19/2023 02:20 PM     BMP    Lab Results   Component Value Date/Time     09/19/2023 02:20 PM    K 4.2 09/19/2023 02:20 PM    K 3.9 01/29/2021 06:08 AM     09/19/2023 02:20 PM    CO2 24 09/19/2023 02:20 PM    BUN 21 09/19/2023 02:20 PM    CREATININE 1.0 09/19/2023 02:20 PM    CALCIUM 9.3 09/19/2023 02:20 PM    GFRAA >60 09/16/2022 10:52 AM    GFRAA >60 01/24/2011 11:40 AM    LABGLOM >60 09/19/2023 02:20 PM    GLUCOSE 114 09/19/2023 02:20 PM     POCGlucose  No results for input(s): \"GLUCOSE\" in the last 72 hours.   [facility]    Lab Results   Component Value Date/Time    PROTIME 13.8 01/28/2021 05:08 AM    INR 1.19 01/28/2021 05:08 AM    APTT 29.7 01/20/2021 10:00 AM     HCG (If  with patient.  Patient verbalized an understanding and agrees to proceed.      EFRAIN WING MD  January 3, 2024  10:26 AM

## 2024-01-03 NOTE — CONSULTS
Gastroenterology Note                 Pre-operative History and Physical    Patient: Oscar Conde  : 1980  CSN:     History Obtained From:   Patient or guardian.      HISTORY OF PRESENT ILLNESS:    The patient is a 43 y.o. male here for Endoscopy.      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     Current Outpatient Medications on File Prior to Encounter   Medication Sig Dispense Refill    amphetamine-dextroamphetamine (ADDERALL XR) 30 MG extended release capsule Take 1 capsule by mouth daily for 30 days. 30 capsule 0    pantoprazole (PROTONIX) 40 MG tablet Take 1 tablet by mouth daily (Patient taking differently: Take 1 tablet by mouth every evening) 90 tablet 3    Multiple Vitamins-Minerals (MULTIVITAMIN ADULT PO) Take 1 tablet by mouth daily      Cholecalciferol (VITAMIN D3) 125 MCG (5000 UT) TABS Take 1 tablet by mouth daily      Loratadine (CLARITIN PO) Take 10 mg by mouth nightly          Allergies:  Patient has no known allergies.      Social History:   Social

## 2024-01-03 NOTE — H&P
Gastroenterology Note                 Pre-operative History and Physical    Patient: Oscar Conde  : 1980  CSN:     History Obtained From:   Patient or guardian.      HISTORY OF PRESENT ILLNESS:    The patient is a 43 y.o. male here for Endoscopy.      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     Current Outpatient Medications on File Prior to Encounter   Medication Sig Dispense Refill    amphetamine-dextroamphetamine (ADDERALL XR) 30 MG extended release capsule Take 1 capsule by mouth daily for 30 days. 30 capsule 0    pantoprazole (PROTONIX) 40 MG tablet Take 1 tablet by mouth daily (Patient taking differently: Take 1 tablet by mouth every evening) 90 tablet 3    Multiple Vitamins-Minerals (MULTIVITAMIN ADULT PO) Take 1 tablet by mouth daily      Cholecalciferol (VITAMIN D3) 125 MCG (5000 UT) TABS Take 1 tablet by mouth daily      Loratadine (CLARITIN PO) Take 10 mg by mouth nightly          Allergies:  Patient has no known allergies.      Social History:   Social

## 2024-05-02 ENCOUNTER — TELEMEDICINE (OUTPATIENT)
Dept: INTERNAL MEDICINE CLINIC | Age: 44
End: 2024-05-02
Payer: COMMERCIAL

## 2024-05-02 DIAGNOSIS — F98.8 ATTENTION DEFICIT DISORDER (ADD) WITHOUT HYPERACTIVITY: Primary | ICD-10-CM

## 2024-05-02 DIAGNOSIS — E66.01 SEVERE OBESITY (BMI 35.0-39.9) WITH COMORBIDITY (HCC): ICD-10-CM

## 2024-05-02 DIAGNOSIS — R19.6 HALITOSIS: ICD-10-CM

## 2024-05-02 DIAGNOSIS — D35.2 PITUITARY MACROADENOMA (HCC): ICD-10-CM

## 2024-05-02 DIAGNOSIS — F33.0 MILD EPISODE OF RECURRENT MAJOR DEPRESSIVE DISORDER (HCC): ICD-10-CM

## 2024-05-02 DIAGNOSIS — J35.8 TONSILLOLITH: ICD-10-CM

## 2024-05-02 PROCEDURE — 99214 OFFICE O/P EST MOD 30 MIN: CPT | Performed by: NURSE PRACTITIONER

## 2024-05-02 RX ORDER — DEXTROAMPHETAMINE SACCHARATE, AMPHETAMINE ASPARTATE MONOHYDRATE, DEXTROAMPHETAMINE SULFATE AND AMPHETAMINE SULFATE 7.5; 7.5; 7.5; 7.5 MG/1; MG/1; MG/1; MG/1
30 CAPSULE, EXTENDED RELEASE ORAL DAILY
Qty: 30 CAPSULE | Refills: 0 | Status: SHIPPED | OUTPATIENT
Start: 2024-05-02 | End: 2024-06-01

## 2024-05-02 RX ORDER — DEXTROAMPHETAMINE SACCHARATE, AMPHETAMINE ASPARTATE MONOHYDRATE, DEXTROAMPHETAMINE SULFATE AND AMPHETAMINE SULFATE 7.5; 7.5; 7.5; 7.5 MG/1; MG/1; MG/1; MG/1
30 CAPSULE, EXTENDED RELEASE ORAL DAILY
Qty: 30 CAPSULE | Refills: 0 | Status: SHIPPED | OUTPATIENT
Start: 2024-07-01 | End: 2024-07-31

## 2024-05-02 RX ORDER — DEXTROAMPHETAMINE SACCHARATE, AMPHETAMINE ASPARTATE MONOHYDRATE, DEXTROAMPHETAMINE SULFATE AND AMPHETAMINE SULFATE 7.5; 7.5; 7.5; 7.5 MG/1; MG/1; MG/1; MG/1
30 CAPSULE, EXTENDED RELEASE ORAL DAILY
Qty: 30 CAPSULE | Refills: 0 | Status: SHIPPED | OUTPATIENT
Start: 2024-06-01 | End: 2024-07-01

## 2024-05-02 SDOH — ECONOMIC STABILITY: HOUSING INSECURITY
IN THE LAST 12 MONTHS, WAS THERE A TIME WHEN YOU DID NOT HAVE A STEADY PLACE TO SLEEP OR SLEPT IN A SHELTER (INCLUDING NOW)?: NO

## 2024-05-02 SDOH — ECONOMIC STABILITY: FOOD INSECURITY: WITHIN THE PAST 12 MONTHS, YOU WORRIED THAT YOUR FOOD WOULD RUN OUT BEFORE YOU GOT MONEY TO BUY MORE.: NEVER TRUE

## 2024-05-02 SDOH — ECONOMIC STABILITY: FOOD INSECURITY: WITHIN THE PAST 12 MONTHS, THE FOOD YOU BOUGHT JUST DIDN'T LAST AND YOU DIDN'T HAVE MONEY TO GET MORE.: NEVER TRUE

## 2024-05-02 SDOH — ECONOMIC STABILITY: INCOME INSECURITY: HOW HARD IS IT FOR YOU TO PAY FOR THE VERY BASICS LIKE FOOD, HOUSING, MEDICAL CARE, AND HEATING?: NOT HARD AT ALL

## 2024-05-02 ASSESSMENT — PATIENT HEALTH QUESTIONNAIRE - PHQ9
8. MOVING OR SPEAKING SO SLOWLY THAT OTHER PEOPLE COULD HAVE NOTICED. OR THE OPPOSITE, BEING SO FIGETY OR RESTLESS THAT YOU HAVE BEEN MOVING AROUND A LOT MORE THAN USUAL: NOT AT ALL
9. THOUGHTS THAT YOU WOULD BE BETTER OFF DEAD, OR OF HURTING YOURSELF: NOT AT ALL
3. TROUBLE FALLING OR STAYING ASLEEP: NOT AT ALL
2. FEELING DOWN, DEPRESSED OR HOPELESS: SEVERAL DAYS
SUM OF ALL RESPONSES TO PHQ QUESTIONS 1-9: 3
1. LITTLE INTEREST OR PLEASURE IN DOING THINGS: NOT AT ALL
SUM OF ALL RESPONSES TO PHQ QUESTIONS 1-9: 3
7. TROUBLE CONCENTRATING ON THINGS, SUCH AS READING THE NEWSPAPER OR WATCHING TELEVISION: NOT AT ALL
10. IF YOU CHECKED OFF ANY PROBLEMS, HOW DIFFICULT HAVE THESE PROBLEMS MADE IT FOR YOU TO DO YOUR WORK, TAKE CARE OF THINGS AT HOME, OR GET ALONG WITH OTHER PEOPLE: NOT DIFFICULT AT ALL
5. POOR APPETITE OR OVEREATING: NOT AT ALL
SUM OF ALL RESPONSES TO PHQ QUESTIONS 1-9: 3
SUM OF ALL RESPONSES TO PHQ QUESTIONS 1-9: 3
4. FEELING TIRED OR HAVING LITTLE ENERGY: SEVERAL DAYS
6. FEELING BAD ABOUT YOURSELF - OR THAT YOU ARE A FAILURE OR HAVE LET YOURSELF OR YOUR FAMILY DOWN: SEVERAL DAYS
SUM OF ALL RESPONSES TO PHQ9 QUESTIONS 1 & 2: 1

## 2024-05-02 ASSESSMENT — ENCOUNTER SYMPTOMS
GASTROINTESTINAL NEGATIVE: 1
SORE THROAT: 1
RESPIRATORY NEGATIVE: 1

## 2024-05-02 NOTE — PROGRESS NOTES
located at Facility (Appt Dept): [address] [place] [address]  Confirm you are appropriately licensed, registered, or certified to deliver care in the state where the patient is located as indicated above. If you are not or unsure, please re-schedule the visit: Yes, I confirm.       Total time spent on this encounter: Not billed by time    --Elio Junior, OLYA - CNP on 5/2/2024 at 11:26 AM    An electronic signature was used to authenticate this note.

## 2024-06-06 ENCOUNTER — OFFICE VISIT (OUTPATIENT)
Dept: ENT CLINIC | Age: 44
End: 2024-06-06
Payer: COMMERCIAL

## 2024-06-06 VITALS
HEART RATE: 81 BPM | BODY MASS INDEX: 39.9 KG/M2 | DIASTOLIC BLOOD PRESSURE: 82 MMHG | HEIGHT: 71 IN | WEIGHT: 285 LBS | SYSTOLIC BLOOD PRESSURE: 116 MMHG | TEMPERATURE: 97.1 F | OXYGEN SATURATION: 96 %

## 2024-06-06 DIAGNOSIS — J35.8 TONSILLITH: Primary | ICD-10-CM

## 2024-06-06 DIAGNOSIS — R19.6 HALITOSIS: ICD-10-CM

## 2024-06-06 PROCEDURE — 99203 OFFICE O/P NEW LOW 30 MIN: CPT | Performed by: STUDENT IN AN ORGANIZED HEALTH CARE EDUCATION/TRAINING PROGRAM

## 2024-06-06 NOTE — PROGRESS NOTES
oriented  EYES: EOMI, Anti-icteric  NOSE: On anterior rhinoscopy there is no epistaxis, nasal mucosa moist and normal appearing, no purulent drainage.   EARS: Normal external appearance; on portable otomicroscopy:     -Ad: External auditory canal without stenosis, tympanic membrane clear, no middle ear effusions or retractions.      -As: External auditory canal without stenosis, tympanic membrane clear, no middle ear effusions or retractions.   Pneumatic otoscopy: Bilateral tympanic membranes mobile pneumatic otoscopy  FACE: HB 1/6 bilaterally, symmetric appearing, sensation equal bilaterally  ORAL CAVITY: No masses or lesions visualized or palpated, uvula is midline, moist mucous membranes, no oropharyngeal masses or oropharyngeal obstruction.  Tonsils 2+ cryptic, no tonsillar exudates, no tonsil stones visualized on examination today  NECK: Normal range of motion, no thyromegaly, trachea is midline, no palpable lymphadenopathy or neck masses, no crepitus  NEURO: Cranial Nerves 2, 3, 4, 5, 6, 7, 11, 12 grossly intact bilaterally     I have performed a head and neck physical exam personally or was physically present during the key or critical portions of the service.      Assessment and Plan     1. Tonsillith  2. Halitosis  -Recommend using dental Waterpik that he has and irrigating his tonsils daily after brushing.  Also recommend utilizing warm salt water gargles or nonalcoholic mouth rinses after brushing.  At this point would not recommend tonsillectomy.      Follow Up     Return if symptoms worsen or fail to improve.        Dr. Isrrael Morel, DO [facility]  Department of Otolaryngology/Head & Neck Surgery  6/6/24    Medical Decision Making:  The following items were considered in medical decision making:  Independent review of images  Review / order clinical lab tests  Review / order radiology tests  Decision to obtain old records    This note was generated completely or in

## 2024-09-19 ENCOUNTER — OFFICE VISIT (OUTPATIENT)
Dept: INTERNAL MEDICINE CLINIC | Age: 44
End: 2024-09-19

## 2024-09-19 VITALS
HEIGHT: 71 IN | BODY MASS INDEX: 39.79 KG/M2 | DIASTOLIC BLOOD PRESSURE: 80 MMHG | SYSTOLIC BLOOD PRESSURE: 120 MMHG | HEART RATE: 76 BPM | WEIGHT: 284.2 LBS | OXYGEN SATURATION: 96 %

## 2024-09-19 DIAGNOSIS — Z00.00 ANNUAL PHYSICAL EXAM: ICD-10-CM

## 2024-09-19 DIAGNOSIS — Z00.00 ANNUAL PHYSICAL EXAM: Primary | ICD-10-CM

## 2024-09-19 DIAGNOSIS — E78.1 PURE HYPERTRIGLYCERIDEMIA: ICD-10-CM

## 2024-09-19 DIAGNOSIS — E66.01 CLASS 2 SEVERE OBESITY DUE TO EXCESS CALORIES WITH SERIOUS COMORBIDITY AND BODY MASS INDEX (BMI) OF 39.0 TO 39.9 IN ADULT (HCC): ICD-10-CM

## 2024-09-19 DIAGNOSIS — F98.8 ATTENTION DEFICIT DISORDER (ADD) WITHOUT HYPERACTIVITY: ICD-10-CM

## 2024-09-19 DIAGNOSIS — D35.2 PITUITARY MACROADENOMA (HCC): ICD-10-CM

## 2024-09-19 DIAGNOSIS — E66.01 CLASS 2 SEVERE OBESITY DUE TO EXCESS CALORIES WITH SERIOUS COMORBIDITY AND BODY MASS INDEX (BMI) OF 39.0 TO 39.9 IN ADULT: ICD-10-CM

## 2024-09-19 DIAGNOSIS — Z23 FLU VACCINE NEED: ICD-10-CM

## 2024-09-19 DIAGNOSIS — K44.9 HIATAL HERNIA WITH GERD: ICD-10-CM

## 2024-09-19 DIAGNOSIS — K21.9 HIATAL HERNIA WITH GERD: ICD-10-CM

## 2024-09-19 LAB
ALBUMIN SERPL-MCNC: 4.7 G/DL (ref 3.4–5)
ALBUMIN/GLOB SERPL: 2.1 {RATIO} (ref 1.1–2.2)
ALP SERPL-CCNC: 107 U/L (ref 40–129)
ALT SERPL-CCNC: 83 U/L (ref 10–40)
ANION GAP SERPL CALCULATED.3IONS-SCNC: 12 MMOL/L (ref 3–16)
AST SERPL-CCNC: 50 U/L (ref 15–37)
BASOPHILS # BLD: 0.1 K/UL (ref 0–0.2)
BASOPHILS NFR BLD: 0.8 %
BILIRUB SERPL-MCNC: 0.3 MG/DL (ref 0–1)
BUN SERPL-MCNC: 21 MG/DL (ref 7–20)
CALCIUM SERPL-MCNC: 9.9 MG/DL (ref 8.3–10.6)
CHLORIDE SERPL-SCNC: 108 MMOL/L (ref 99–110)
CHOLEST SERPL-MCNC: 204 MG/DL (ref 0–199)
CO2 SERPL-SCNC: 23 MMOL/L (ref 21–32)
CREAT SERPL-MCNC: 1.1 MG/DL (ref 0.9–1.3)
DEPRECATED RDW RBC AUTO: 14.2 % (ref 12.4–15.4)
EOSINOPHIL # BLD: 0.2 K/UL (ref 0–0.6)
EOSINOPHIL NFR BLD: 3.1 %
GFR SERPLBLD CREATININE-BSD FMLA CKD-EPI: 85 ML/MIN/{1.73_M2}
GLUCOSE SERPL-MCNC: 132 MG/DL (ref 70–99)
HCT VFR BLD AUTO: 44.6 % (ref 40.5–52.5)
HDLC SERPL-MCNC: 33 MG/DL (ref 40–60)
HGB BLD-MCNC: 14.8 G/DL (ref 13.5–17.5)
LDLC SERPL CALC-MCNC: 124 MG/DL
LYMPHOCYTES # BLD: 1.8 K/UL (ref 1–5.1)
LYMPHOCYTES NFR BLD: 28.1 %
MCH RBC QN AUTO: 28.3 PG (ref 26–34)
MCHC RBC AUTO-ENTMCNC: 33.3 G/DL (ref 31–36)
MCV RBC AUTO: 85 FL (ref 80–100)
MONOCYTES # BLD: 0.4 K/UL (ref 0–1.3)
MONOCYTES NFR BLD: 7.1 %
NEUTROPHILS # BLD: 3.8 K/UL (ref 1.7–7.7)
NEUTROPHILS NFR BLD: 60.9 %
PLATELET # BLD AUTO: 259 K/UL (ref 135–450)
PMV BLD AUTO: 8.4 FL (ref 5–10.5)
POTASSIUM SERPL-SCNC: 4.3 MMOL/L (ref 3.5–5.1)
PROT SERPL-MCNC: 6.9 G/DL (ref 6.4–8.2)
RBC # BLD AUTO: 5.25 M/UL (ref 4.2–5.9)
SODIUM SERPL-SCNC: 143 MMOL/L (ref 136–145)
TRIGL SERPL-MCNC: 236 MG/DL (ref 0–150)
VLDLC SERPL CALC-MCNC: 47 MG/DL
WBC # BLD AUTO: 6.2 K/UL (ref 4–11)

## 2024-09-19 RX ORDER — DEXTROAMPHETAMINE SACCHARATE, AMPHETAMINE ASPARTATE MONOHYDRATE, DEXTROAMPHETAMINE SULFATE AND AMPHETAMINE SULFATE 7.5; 7.5; 7.5; 7.5 MG/1; MG/1; MG/1; MG/1
30 CAPSULE, EXTENDED RELEASE ORAL DAILY
Qty: 30 CAPSULE | Refills: 0 | Status: SHIPPED | OUTPATIENT
Start: 2024-11-18 | End: 2024-12-18

## 2024-09-19 RX ORDER — DEXTROAMPHETAMINE SACCHARATE, AMPHETAMINE ASPARTATE MONOHYDRATE, DEXTROAMPHETAMINE SULFATE AND AMPHETAMINE SULFATE 7.5; 7.5; 7.5; 7.5 MG/1; MG/1; MG/1; MG/1
30 CAPSULE, EXTENDED RELEASE ORAL DAILY
Qty: 30 CAPSULE | Refills: 0 | Status: SHIPPED | OUTPATIENT
Start: 2024-10-19 | End: 2024-11-18

## 2024-09-19 RX ORDER — PANTOPRAZOLE SODIUM 40 MG/1
40 TABLET, DELAYED RELEASE ORAL EVERY EVENING
Qty: 90 TABLET | Refills: 3 | Status: SHIPPED | OUTPATIENT
Start: 2024-09-19

## 2024-09-19 RX ORDER — DEXTROAMPHETAMINE SACCHARATE, AMPHETAMINE ASPARTATE MONOHYDRATE, DEXTROAMPHETAMINE SULFATE AND AMPHETAMINE SULFATE 7.5; 7.5; 7.5; 7.5 MG/1; MG/1; MG/1; MG/1
30 CAPSULE, EXTENDED RELEASE ORAL DAILY
Qty: 30 CAPSULE | Refills: 0 | Status: SHIPPED | OUTPATIENT
Start: 2024-09-19 | End: 2024-10-19

## 2024-09-19 ASSESSMENT — ENCOUNTER SYMPTOMS
RESPIRATORY NEGATIVE: 1
BACK PAIN: 1
GASTROINTESTINAL NEGATIVE: 1

## 2024-09-20 LAB
EST. AVERAGE GLUCOSE BLD GHB EST-MCNC: 137 MG/DL
HBA1C MFR BLD: 6.4 %

## 2024-09-23 LAB
6MAM UR QL: NOT DETECTED
7AMINOCLONAZEPAM UR QL: NOT DETECTED
A-OH ALPRAZ UR QL: NOT DETECTED
ALPHA-OH-MIDAZOLAM, URINE: NOT DETECTED
ALPRAZ UR QL: NOT DETECTED
AMPHET UR QL SCN: PRESENT
ANNOTATION COMMENT IMP: NORMAL
ANNOTATION COMMENT IMP: NORMAL
BARBITURATES UR QL: NEGATIVE
BUPRENORPHINE UR QL: NOT DETECTED
BZE UR QL: NEGATIVE
CARBOXYTHC UR QL: NEGATIVE
CARISOPRODOL UR QL: NEGATIVE
CLONAZEPAM UR QL: NOT DETECTED
CODEINE UR QL: NOT DETECTED
CREAT UR-MCNC: 386.8 MG/DL (ref 20–400)
DIAZEPAM UR QL: NOT DETECTED
ETHYL GLUCURONIDE UR QL: NEGATIVE
FENTANYL UR QL: NOT DETECTED
GABAPENTIN: NOT DETECTED
HYDROCODONE UR QL: NOT DETECTED
HYDROMORPHONE UR QL: NOT DETECTED
LORAZEPAM UR QL: NOT DETECTED
MDA UR QL: NOT DETECTED
MDEA UR QL: NOT DETECTED
MDMA UR QL: NOT DETECTED
MEPERIDINE UR QL: NOT DETECTED
METHADONE UR QL: NEGATIVE
METHAMPHET UR QL: NOT DETECTED
MIDAZOLAM UR QL SCN: NOT DETECTED
MORPHINE UR QL: NOT DETECTED
NALOXONE: NOT DETECTED
NORBUPRENORPHINE UR QL CFM: NOT DETECTED
NORDIAZEPAM UR QL: NOT DETECTED
NORFENTANYL UR QL: NOT DETECTED
NORHYDROCODONE UR QL CFM: NOT DETECTED
NOROXYCODONE UR QL CFM: NOT DETECTED
NOROXYMORPHONE, URINE: NOT DETECTED
OXAZEPAM UR QL: NOT DETECTED
OXYCODONE UR QL: NOT DETECTED
OXYMORPHONE UR QL: NOT DETECTED
PATHOLOGY STUDY: NORMAL
PCP UR QL: NEGATIVE
PHENTERMINE UR QL: NOT DETECTED
PPAA UR QL: NOT DETECTED
PREGABALIN: NOT DETECTED
SERVICE CMNT-IMP: NORMAL
TAPENTADOL UR QL SCN: NOT DETECTED
TAPENTADOL-O-SULFATE, URINE: NOT DETECTED
TEMAZEPAM UR QL: NOT DETECTED
TRAMADOL UR QL: NEGATIVE
ZOLPIDEM UR QL: NOT DETECTED

## 2024-09-25 DIAGNOSIS — K44.9 HIATAL HERNIA WITH GERD: ICD-10-CM

## 2024-09-25 DIAGNOSIS — K21.9 HIATAL HERNIA WITH GERD: ICD-10-CM

## 2024-09-25 RX ORDER — PANTOPRAZOLE SODIUM 40 MG/1
40 TABLET, DELAYED RELEASE ORAL DAILY
Qty: 90 TABLET | Refills: 3 | OUTPATIENT
Start: 2024-09-25

## (undated) DEVICE — ENDOSCOPY KIT: Brand: MEDLINE INDUSTRIES, INC.

## (undated) DEVICE — 3L THIN WALL CAN: Brand: CRD

## (undated) DEVICE — SOLUTION IV 1000ML LAC RINGERS PH 6.5 INJ USP VIAFLX PLAS

## (undated) DEVICE — SURE SET-DOUBLE BASIN-LF: Brand: MEDLINE INDUSTRIES, INC.

## (undated) DEVICE — TUBING 1895522 5PK STRAIGHTSHOT TO XPS: Brand: STRAIGHTSHOT®

## (undated) DEVICE — SURGICAL SET UP - SURE SET: Brand: MEDLINE INDUSTRIES, INC.

## (undated) DEVICE — ELECTROSURGICAL PENCIL ROCKER SWITCH NON COATED BLADE ELECTRODE 10 FT (3 M) CORD HOLSTER: Brand: MEGADYNE

## (undated) DEVICE — PLATE ES AD W 9FT CRD 2

## (undated) DEVICE — GLOVE SURG SZ 75 L12IN FNGR THK94MIL TRNSLUC YEL LTX

## (undated) DEVICE — SNARES COLD OVAL 10MM THIN

## (undated) DEVICE — SUTURE ABSORBABLE MONOFILAMENT 4-0 SC-1 18 IN PLN GUT 1824H

## (undated) DEVICE — MICRODISSECTION NEEDLE STRAIGHT SLEEVE: Brand: COLORADO

## (undated) DEVICE — TOWEL,OR,DSP,ST,WHITE,DLX,4/PK,20PK/CS: Brand: MEDLINE

## (undated) DEVICE — FORMALIN CLEAR VIAL 20 ML 10%

## (undated) DEVICE — DRAPE IRRIG FLD WRM W44XL44IN W/ AORN STD PRTBL INTRATEMP

## (undated) DEVICE — SHEET,DRAPE,53X77,STERILE: Brand: MEDLINE

## (undated) DEVICE — 3M™ TEGADERM™ TRANSPARENT FILM DRESSING FRAME STYLE, 1624W, 2-3/8 IN X 2-3/4 IN (6 CM X 7 CM), 100/CT 4CT/CASE: Brand: 3M™ TEGADERM™

## (undated) DEVICE — TOWEL,OR,DSP,ST,BLUE,DLX,8/PK,10PK/CS: Brand: MEDLINE

## (undated) DEVICE — SPONGE GZ W4XL4IN COT 12 PLY TYP VII WVN C FLD DSGN

## (undated) DEVICE — SUTURE VCRL SZ 3-0 L18IN ABSRB UD L26MM SH 1/2 CIR J864D

## (undated) DEVICE — SYRINGE MED 10ML TRNSLUC BRL PLUNG BLK MRK POLYPR CTRL

## (undated) DEVICE — SOLUTION IV 250ML 0.9% SOD CHL PH 5 INJ USP VIAFLX PLAS

## (undated) DEVICE — FIRM 8CM: Brand: NASOPORE

## (undated) DEVICE — NEEDLE SPNL 25GA L3.5IN BLU HUB S STL REG WALL FIT STYL W/

## (undated) DEVICE — INSTRUMENT TRACKER 9733533XOM ENT 1PK

## (undated) DEVICE — DRESSING GERM DIA1IN CNTR H DIA7MM BLU CHG ANTIMIC PROTCT

## (undated) DEVICE — JEWISH HOSPITAL TURNOVER KIT: Brand: MEDLINE INDUSTRIES, INC.

## (undated) DEVICE — SHEATH 1912000 5PK 4MM/0DEG STORZ XOMED: Brand: ENDO-SCRUB®

## (undated) DEVICE — UNDERGLOVE SURG SZ 8 BLU LTX FREE SYN POLYISOPRENE POLYMER

## (undated) DEVICE — COVER,MAYO STAND,STERILE: Brand: MEDLINE

## (undated) DEVICE — SUTURE MCRYL SZ 4-0 L27IN ABSRB UD L19MM PS-2 1/2 CIR PRIM Y426H

## (undated) DEVICE — KIT,ANTI FOG,W/SPONGE & FLUID,SOFT PACK: Brand: MEDLINE

## (undated) DEVICE — PACK,EENT,TURBAN DRAPE,PK II: Brand: MEDLINE

## (undated) DEVICE — CONTAINER,SPECIMEN,PNEU TUBE,3OZ,OR STRL: Brand: MEDLINE

## (undated) DEVICE — TRAY CATHETER 16FR F INCLUDE BARDX IC COMPLT CARE DRNGE BG

## (undated) DEVICE — SPONGE,NEURO,0.5"X3",XR,STRL,LF,10/PK: Brand: MEDLINE

## (undated) DEVICE — BLADE 1884080EM TRICUT 4MMX13CM M4 ROHS: Brand: FUSION®

## (undated) DEVICE — INTENDED USE FOR SURGICAL MARKING ON INTACT SKIN, ALSO PROVIDES A PERMANENT METHOD OF IDENTIFYING OBJECTS IN THE OPERATING ROOM: Brand: WRITESITE® PLUS MINI PREP RESISTANT MARKER

## (undated) DEVICE — DISPOSABLE CURVED APPLICATOR

## (undated) DEVICE — CABLE BPLR L12FT FLYING LD DISPOSABLE

## (undated) DEVICE — DISPOSABLE IRRIGATION CASSETTE: Brand: CORE

## (undated) DEVICE — SHEET,T,THYROID,STERILE: Brand: MEDLINE

## (undated) DEVICE — DRAPE,INSTRUMENT,MAGNETIC,10X16: Brand: MEDLINE

## (undated) DEVICE — GAUZE,SPONGE,4"X4",16PLY,XRAY,STRL,LF: Brand: MEDLINE

## (undated) DEVICE — GLOVE ORANGE PI 7 1/2   MSG9075

## (undated) DEVICE — 3M™ IOBAN™ 2 ANTIMICROBIAL INCISE DRAPE 6640EZ: Brand: IOBAN™ 2

## (undated) DEVICE — TUBE SUCT LAPSCP

## (undated) DEVICE — COUNTER NDL 40 COUNT HLD 70 NUM FOAM BLK SGL MAG W BLDE REMV

## (undated) DEVICE — TRAP POLYP ETRAP

## (undated) DEVICE — BUR 1884015RTD TAPER CHOANAL ATRESIA 30K

## (undated) DEVICE — COVER LT HNDL BLU PLAS

## (undated) DEVICE — NEURO SPONGES: Brand: DEROYAL

## (undated) DEVICE — SYRINGE IRRIG 60ML SFT PLIABLE BLB EZ TO GRP 1 HND USE W/

## (undated) DEVICE — SUTURE PROL SZ 2-0 L30IN NONABSORBABLE BLU L26MM CT-2 1/2 8411H

## (undated) DEVICE — ANES EXTENSION SET 90IN-LF: Brand: MEDLINE INDUSTRIES, INC.

## (undated) DEVICE — PATIENT TRACKER 9734887XOM NON-INVASIVE

## (undated) DEVICE — TUBING, SUCTION, 1/4" X 12', STRAIGHT: Brand: MEDLINE

## (undated) DEVICE — GARMENT,MEDLINE,DVT,INT,CALF,MED, GEN2: Brand: MEDLINE

## (undated) DEVICE — GLOVE SURG SZ 6 L12IN FNGR THK75MIL WHT LTX POLYMER BEAD

## (undated) DEVICE — MARKER,SKIN,WI/RULER AND LABELS: Brand: MEDLINE

## (undated) DEVICE — PROTECTOR ULN NRV PUR FOAM HK LOOP STRP ANATOMICALLY

## (undated) DEVICE — BLANKET WRM W40.2XL55.9IN IORT LO BODY + MISTRAL AIR